# Patient Record
Sex: FEMALE | Race: WHITE | NOT HISPANIC OR LATINO | Employment: OTHER | URBAN - METROPOLITAN AREA
[De-identification: names, ages, dates, MRNs, and addresses within clinical notes are randomized per-mention and may not be internally consistent; named-entity substitution may affect disease eponyms.]

---

## 2017-02-13 ENCOUNTER — HOSPITAL ENCOUNTER (OUTPATIENT)
Dept: ONCOLOGY | Facility: CLINIC | Age: 67
Discharge: HOME | End: 2017-02-13
Attending: INTERNAL MEDICINE | Admitting: INTERNAL MEDICINE

## 2017-09-21 ENCOUNTER — HOSPITAL ENCOUNTER (OUTPATIENT)
Dept: ONCOLOGY | Facility: HOSPITAL | Age: 67
Discharge: HOME | End: 2017-09-21
Attending: NURSE PRACTITIONER | Admitting: NURSE PRACTITIONER

## 2017-09-21 ENCOUNTER — CLINICAL SUPPORT (OUTPATIENT)
Dept: ONCOLOGY | Facility: HOSPITAL | Age: 67
End: 2017-09-21

## 2017-09-21 ENCOUNTER — HOSPITAL ENCOUNTER (OUTPATIENT)
Dept: ONCOLOGY | Facility: CLINIC | Age: 67
Setting detail: INFUSION SERIES
Discharge: HOME | End: 2017-09-21
Attending: NURSE PRACTITIONER | Admitting: NURSE PRACTITIONER

## 2017-09-21 NOTE — PROGRESS NOTES
PATIENTS ONCOLOGY RECORD LOCATED IN [facility]      Subjective     Name:  DAT LAUREN     Date:  2017  Address:  [address] [place] [address]  Home: [phone]  :  1950 AGE:  67 y.o.        RECORDS OBTAINED:  Patients Oncology Record is located in [facility]

## 2018-01-03 ENCOUNTER — HOSPITAL ENCOUNTER (OUTPATIENT)
Dept: FAMILY MEDICINE CLINIC | Facility: CLINIC | Age: 68
Setting detail: SPECIMEN
Discharge: HOME | End: 2018-01-03
Attending: FAMILY MEDICINE | Admitting: FAMILY MEDICINE

## 2018-01-03 LAB
ALBUMIN SERPL-MCNC: 3.7 G/DL (ref 3.5–4.8)
ALBUMIN/GLOB SERPL: 1.4 {RATIO} (ref 1–1.7)
ALP SERPL-CCNC: 78 IU/L (ref 32–91)
ALT SERPL-CCNC: 15 IU/L (ref 14–54)
ANION GAP SERPL CALC-SCNC: 10.7 MMOL/L (ref 10–20)
AST SERPL-CCNC: 18 IU/L (ref 15–41)
BILIRUB SERPL-MCNC: 0.7 MG/DL (ref 0.3–1.2)
BUN SERPL-MCNC: 24 MG/DL (ref 8–20)
BUN/CREAT SERPL: 30 (ref 5.4–26.2)
CALCIUM SERPL-MCNC: 9.5 MG/DL (ref 8.9–10.3)
CHLORIDE SERPL-SCNC: 106 MMOL/L (ref 101–111)
CHOLEST SERPL-MCNC: 208 MG/DL
CHOLEST/HDLC SERPL: 2.5 {RATIO}
CONV CO2: 27 MMOL/L (ref 22–32)
CONV LDL CHOLESTEROL DIRECT: 117 MG/DL (ref 0–100)
CONV TOTAL PROTEIN: 6.4 G/DL (ref 6.1–7.9)
CREAT UR-MCNC: 0.8 MG/DL (ref 0.4–1)
GLOBULIN UR ELPH-MCNC: 2.7 G/DL (ref 2.5–3.8)
GLUCOSE SERPL-MCNC: 97 MG/DL (ref 65–99)
HDLC SERPL-MCNC: 83 MG/DL
LDLC/HDLC SERPL: 1.4 {RATIO}
LIPID INTERPRETATION: ABNORMAL
POTASSIUM SERPL-SCNC: 4.7 MMOL/L (ref 3.6–5.1)
SODIUM SERPL-SCNC: 139 MMOL/L (ref 136–144)
TRIGL SERPL-MCNC: 35 MG/DL
VLDLC SERPL CALC-MCNC: 8 MG/DL

## 2018-05-14 ENCOUNTER — HOSPITAL ENCOUNTER (OUTPATIENT)
Dept: ONCOLOGY | Facility: CLINIC | Age: 68
Setting detail: INFUSION SERIES
Discharge: HOME | End: 2018-05-14
Attending: INTERNAL MEDICINE | Admitting: INTERNAL MEDICINE

## 2018-05-14 ENCOUNTER — CLINICAL SUPPORT (OUTPATIENT)
Dept: ONCOLOGY | Facility: HOSPITAL | Age: 68
End: 2018-05-14

## 2018-05-14 NOTE — PROGRESS NOTES
PATIENTS ONCOLOGY RECORD LOCATED IN [facility]      Subjective     Name:  DAT LAUREN     Date:  2018  Address:  [address] [place] [address]  Home: [phone]  :  1950 AGE:  68 y.o.        RECORDS OBTAINED:  Patients Oncology Record is located in [facility]

## 2018-10-05 ENCOUNTER — HOSPITAL ENCOUNTER (OUTPATIENT)
Dept: FAMILY MEDICINE CLINIC | Facility: CLINIC | Age: 68
Setting detail: SPECIMEN
Discharge: HOME | End: 2018-10-05
Attending: FAMILY MEDICINE | Admitting: FAMILY MEDICINE

## 2018-10-05 LAB
BASOPHILS # BLD AUTO: 0 10*3/UL (ref 0–0.2)
BASOPHILS NFR BLD AUTO: 1 % (ref 0–2)
DIFFERENTIAL METHOD BLD: (no result)
EOSINOPHIL # BLD AUTO: 0.4 10*3/UL (ref 0–0.3)
EOSINOPHIL # BLD AUTO: 8 % (ref 0–3)
ERYTHROCYTE [DISTWIDTH] IN BLOOD BY AUTOMATED COUNT: 12.9 % (ref 11.5–14.5)
HCT VFR BLD AUTO: 38.6 % (ref 35–49)
HGB BLD-MCNC: 13.1 G/DL (ref 12–15)
LYMPHOCYTES # BLD AUTO: 2.2 10*3/UL (ref 0.8–4.8)
LYMPHOCYTES NFR BLD AUTO: 40 % (ref 18–42)
MCH RBC QN AUTO: 30.4 PG (ref 26–32)
MCHC RBC AUTO-ENTMCNC: 33.9 G/DL (ref 32–36)
MCV RBC AUTO: 89.6 FL (ref 80–94)
MONOCYTES # BLD AUTO: 0.5 10*3/UL (ref 0.1–1.3)
MONOCYTES NFR BLD AUTO: 9 % (ref 2–11)
NEUTROPHILS # BLD AUTO: 2.4 10*3/UL (ref 2.3–8.6)
NEUTROPHILS NFR BLD AUTO: 42 % (ref 50–75)
NRBC BLD AUTO-RTO: 0 /100{WBCS}
NRBC/RBC NFR BLD MANUAL: 0 10*3/UL
PLATELET # BLD AUTO: 214 10*3/UL (ref 150–450)
PMV BLD AUTO: 8.6 FL (ref 7.4–10.4)
RBC # BLD AUTO: 4.3 10*6/UL (ref 4–5.4)
WBC # BLD AUTO: 5.5 10*3/UL (ref 4.5–11.5)

## 2019-01-07 ENCOUNTER — HOSPITAL ENCOUNTER (OUTPATIENT)
Dept: MAMMOGRAPHY | Facility: HOSPITAL | Age: 69
Discharge: HOME | End: 2019-01-07
Attending: INTERNAL MEDICINE | Admitting: INTERNAL MEDICINE

## 2019-01-07 ENCOUNTER — HOSPITAL ENCOUNTER (OUTPATIENT)
Dept: FAMILY MEDICINE CLINIC | Facility: CLINIC | Age: 69
Setting detail: SPECIMEN
Discharge: HOME | End: 2019-01-07
Attending: FAMILY MEDICINE | Admitting: FAMILY MEDICINE

## 2019-01-07 LAB
ALBUMIN SERPL-MCNC: 3.8 G/DL (ref 3.5–4.8)
ALBUMIN/GLOB SERPL: 1.5 {RATIO} (ref 1–1.7)
ALP SERPL-CCNC: 80 IU/L (ref 32–91)
ALT SERPL-CCNC: 17 IU/L (ref 14–54)
ANION GAP SERPL CALC-SCNC: 12.9 MMOL/L (ref 10–20)
AST SERPL-CCNC: 17 IU/L (ref 15–41)
BILIRUB SERPL-MCNC: 0.8 MG/DL (ref 0.3–1.2)
BUN SERPL-MCNC: 25 MG/DL (ref 8–20)
BUN/CREAT SERPL: 35.7 (ref 5.4–26.2)
CALCIUM SERPL-MCNC: 8.9 MG/DL (ref 8.9–10.3)
CHLORIDE SERPL-SCNC: 102 MMOL/L (ref 101–111)
CHOLEST SERPL-MCNC: 217 MG/DL
CHOLEST/HDLC SERPL: 2.3 {RATIO}
CONV CO2: 26 MMOL/L (ref 22–32)
CONV LDL CHOLESTEROL DIRECT: 117 MG/DL (ref 0–100)
CONV TOTAL PROTEIN: 6.3 G/DL (ref 6.1–7.9)
CREAT UR-MCNC: 0.7 MG/DL (ref 0.4–1)
GLOBULIN UR ELPH-MCNC: 2.5 G/DL (ref 2.5–3.8)
GLUCOSE SERPL-MCNC: 94 MG/DL (ref 65–99)
HDLC SERPL-MCNC: 94 MG/DL
LDLC/HDLC SERPL: 1.2 {RATIO}
LIPID INTERPRETATION: ABNORMAL
POTASSIUM SERPL-SCNC: 3.9 MMOL/L (ref 3.6–5.1)
SODIUM SERPL-SCNC: 137 MMOL/L (ref 136–144)
TRIGL SERPL-MCNC: 49 MG/DL
VLDLC SERPL CALC-MCNC: 6 MG/DL

## 2019-05-13 ENCOUNTER — CLINICAL SUPPORT (OUTPATIENT)
Dept: ONCOLOGY | Facility: HOSPITAL | Age: 69
End: 2019-05-13

## 2019-05-13 ENCOUNTER — HOSPITAL ENCOUNTER (OUTPATIENT)
Dept: ONCOLOGY | Facility: CLINIC | Age: 69
Setting detail: INFUSION SERIES
Discharge: HOME | End: 2019-05-13
Attending: INTERNAL MEDICINE | Admitting: INTERNAL MEDICINE

## 2019-05-13 NOTE — PROGRESS NOTES
PATIENTS ONCOLOGY RECORD LOCATED IN [facility]      Subjective     Name:  DAT LAUREN     Date:  2019  Address:  [address] [place] [address]  Home: [unfilled]  :  1950 AGE:  69 y.o.        RECORDS OBTAINED:  Patients Oncology Record is located in [facility]

## 2019-07-15 PROBLEM — M19.90 ARTHRITIS: Status: ACTIVE | Noted: 2019-07-15

## 2019-09-26 ENCOUNTER — TELEPHONE (OUTPATIENT)
Dept: FAMILY MEDICINE CLINIC | Facility: CLINIC | Age: 69
End: 2019-09-26

## 2019-09-26 RX ORDER — SCOLOPAMINE TRANSDERMAL SYSTEM 1 MG/1
1 PATCH, EXTENDED RELEASE TRANSDERMAL
Qty: 4 PATCH | Refills: 0 | Status: SHIPPED | OUTPATIENT
Start: 2019-09-26 | End: 2020-02-18

## 2019-12-16 ENCOUNTER — TELEPHONE (OUTPATIENT)
Dept: ONCOLOGY | Facility: CLINIC | Age: 69
End: 2019-12-16

## 2019-12-16 ENCOUNTER — TRANSCRIBE ORDERS (OUTPATIENT)
Dept: ADMINISTRATIVE | Facility: HOSPITAL | Age: 69
End: 2019-12-16

## 2019-12-16 DIAGNOSIS — Z12.39 SCREENING BREAST EXAMINATION: Primary | ICD-10-CM

## 2019-12-16 NOTE — TELEPHONE ENCOUNTER
Ms. Gross left message asking about Dr. Beard leaving.  Returned call, discussed options.  She will stay w/ Dr. Beard.  She called [facility] and scheduled mammogram, appears order from Dr. Beard's current office.  May appt canceled.

## 2019-12-30 ENCOUNTER — TELEPHONE (OUTPATIENT)
Dept: FAMILY MEDICINE CLINIC | Facility: CLINIC | Age: 69
End: 2019-12-30

## 2019-12-30 DIAGNOSIS — Z78.0 POSTMENOPAUSAL STATUS: Primary | ICD-10-CM

## 2020-01-08 ENCOUNTER — TELEPHONE (OUTPATIENT)
Dept: FAMILY MEDICINE CLINIC | Facility: CLINIC | Age: 70
End: 2020-01-08

## 2020-01-08 DIAGNOSIS — E78.2 MIXED HYPERLIPIDEMIA: Primary | ICD-10-CM

## 2020-01-08 DIAGNOSIS — E03.9 ACQUIRED HYPOTHYROIDISM: ICD-10-CM

## 2020-01-15 RX ORDER — MELOXICAM 15 MG/1
15 TABLET ORAL DAILY
Qty: 90 TABLET | Refills: 0 | Status: SHIPPED | OUTPATIENT
Start: 2020-01-15 | End: 2020-02-18 | Stop reason: SDUPTHER

## 2020-01-15 RX ORDER — LEVOTHYROXINE SODIUM 0.05 MG/1
50 TABLET ORAL DAILY
Qty: 90 TABLET | Refills: 0 | Status: SHIPPED | OUTPATIENT
Start: 2020-01-15 | End: 2020-02-18 | Stop reason: DRUGHIGH

## 2020-01-20 ENCOUNTER — HOSPITAL ENCOUNTER (OUTPATIENT)
Dept: ULTRASOUND IMAGING | Facility: HOSPITAL | Age: 70
Discharge: HOME | End: 2020-01-20

## 2020-01-20 ENCOUNTER — HOSPITAL ENCOUNTER (OUTPATIENT)
Dept: MAMMOGRAPHY | Facility: HOSPITAL | Age: 70
Discharge: HOME | End: 2020-01-20
Admitting: FAMILY MEDICINE

## 2020-01-20 ENCOUNTER — HOSPITAL ENCOUNTER (OUTPATIENT)
Dept: BONE DENSITY | Facility: HOSPITAL | Age: 70
Discharge: HOME | End: 2020-01-20

## 2020-01-20 DIAGNOSIS — N64.59 INVERSION, NIPPLE: ICD-10-CM

## 2020-01-20 DIAGNOSIS — N64.52 NIPPLE DISCHARGE: ICD-10-CM

## 2020-01-20 DIAGNOSIS — Z12.39 SCREENING BREAST EXAMINATION: ICD-10-CM

## 2020-01-20 PROCEDURE — 76642 ULTRASOUND BREAST LIMITED: CPT

## 2020-01-20 PROCEDURE — G0279 TOMOSYNTHESIS, MAMMO: HCPCS

## 2020-01-20 PROCEDURE — 77066 DX MAMMO INCL CAD BI: CPT

## 2020-01-20 PROCEDURE — 77080 DXA BONE DENSITY AXIAL: CPT

## 2020-02-10 ENCOUNTER — LAB (OUTPATIENT)
Dept: FAMILY MEDICINE CLINIC | Facility: CLINIC | Age: 70
End: 2020-02-10

## 2020-02-10 DIAGNOSIS — E78.2 MIXED HYPERLIPIDEMIA: ICD-10-CM

## 2020-02-10 LAB
ALBUMIN SERPL-MCNC: 4.2 G/DL (ref 3.5–5.2)
ALBUMIN/GLOB SERPL: 1.9 G/DL
ALP SERPL-CCNC: 93 U/L (ref 39–117)
ALT SERPL W P-5'-P-CCNC: 13 U/L (ref 1–33)
ANION GAP SERPL CALCULATED.3IONS-SCNC: 11.4 MMOL/L (ref 5–15)
AST SERPL-CCNC: 12 U/L (ref 1–32)
BILIRUB SERPL-MCNC: 0.4 MG/DL (ref 0.2–1.2)
BUN BLD-MCNC: 29 MG/DL (ref 8–23)
BUN/CREAT SERPL: 39.7 (ref 7–25)
CALCIUM SPEC-SCNC: 9.2 MG/DL (ref 8.6–10.5)
CHLORIDE SERPL-SCNC: 101 MMOL/L (ref 98–107)
CHOLEST SERPL-MCNC: 215 MG/DL (ref 0–200)
CO2 SERPL-SCNC: 26.6 MMOL/L (ref 22–29)
CREAT BLD-MCNC: 0.73 MG/DL (ref 0.57–1)
GFR SERPL CREATININE-BSD FRML MDRD: 79 ML/MIN/1.73
GLOBULIN UR ELPH-MCNC: 2.2 GM/DL
GLUCOSE BLD-MCNC: 95 MG/DL (ref 65–99)
HDLC SERPL-MCNC: 90 MG/DL (ref 40–60)
LDLC SERPL CALC-MCNC: 114 MG/DL (ref 0–100)
LDLC/HDLC SERPL: 1.27 {RATIO}
POTASSIUM BLD-SCNC: 3.7 MMOL/L (ref 3.5–5.2)
PROT SERPL-MCNC: 6.4 G/DL (ref 6–8.5)
SODIUM BLD-SCNC: 139 MMOL/L (ref 136–145)
TRIGL SERPL-MCNC: 54 MG/DL (ref 0–150)
TSH SERPL DL<=0.05 MIU/L-ACNC: 7.13 UIU/ML (ref 0.27–4.2)
VLDLC SERPL-MCNC: 10.8 MG/DL (ref 5–40)

## 2020-02-10 PROCEDURE — 84443 ASSAY THYROID STIM HORMONE: CPT | Performed by: FAMILY MEDICINE

## 2020-02-10 PROCEDURE — 80053 COMPREHEN METABOLIC PANEL: CPT | Performed by: FAMILY MEDICINE

## 2020-02-10 PROCEDURE — 36415 COLL VENOUS BLD VENIPUNCTURE: CPT | Performed by: FAMILY MEDICINE

## 2020-02-10 PROCEDURE — 80061 LIPID PANEL: CPT | Performed by: FAMILY MEDICINE

## 2020-02-18 ENCOUNTER — OFFICE VISIT (OUTPATIENT)
Dept: FAMILY MEDICINE CLINIC | Facility: CLINIC | Age: 70
End: 2020-02-18

## 2020-02-18 VITALS
HEART RATE: 65 BPM | BODY MASS INDEX: 30.16 KG/M2 | DIASTOLIC BLOOD PRESSURE: 83 MMHG | SYSTOLIC BLOOD PRESSURE: 157 MMHG | OXYGEN SATURATION: 98 % | WEIGHT: 181 LBS | HEIGHT: 65 IN

## 2020-02-18 DIAGNOSIS — Z00.00 ENCOUNTER FOR PREVENTIVE CARE: Primary | ICD-10-CM

## 2020-02-18 DIAGNOSIS — E78.2 MIXED HYPERLIPIDEMIA: ICD-10-CM

## 2020-02-18 DIAGNOSIS — E03.9 ACQUIRED HYPOTHYROIDISM: ICD-10-CM

## 2020-02-18 PROBLEM — N39.46 MIXED STRESS AND URGE URINARY INCONTINENCE: Status: ACTIVE | Noted: 2019-01-07

## 2020-02-18 PROBLEM — R21 SKIN RASH: Status: ACTIVE | Noted: 2018-09-29

## 2020-02-18 LAB
BILIRUB BLD-MCNC: NEGATIVE MG/DL
CLARITY, POC: CLEAR
COLOR UR: YELLOW
GLUCOSE UR STRIP-MCNC: NEGATIVE MG/DL
KETONES UR QL: NEGATIVE
LEUKOCYTE EST, POC: NEGATIVE
NITRITE UR-MCNC: NEGATIVE MG/ML
PH UR: 7 [PH] (ref 5–8)
PROT UR STRIP-MCNC: NEGATIVE MG/DL
RBC # UR STRIP: ABNORMAL /UL
SP GR UR: 1.02 (ref 1–1.03)
UROBILINOGEN UR QL: NORMAL

## 2020-02-18 PROCEDURE — 81003 URINALYSIS AUTO W/O SCOPE: CPT | Performed by: FAMILY MEDICINE

## 2020-02-18 PROCEDURE — 99397 PER PM REEVAL EST PAT 65+ YR: CPT | Performed by: FAMILY MEDICINE

## 2020-02-18 PROCEDURE — G0439 PPPS, SUBSEQ VISIT: HCPCS | Performed by: FAMILY MEDICINE

## 2020-02-18 RX ORDER — ELECTROLYTES/DEXTROSE
SOLUTION, ORAL ORAL
COMMUNITY

## 2020-02-18 RX ORDER — MELOXICAM 15 MG/1
15 TABLET ORAL DAILY
Qty: 90 TABLET | Refills: 3 | Status: SHIPPED | OUTPATIENT
Start: 2020-02-18 | End: 2021-04-07

## 2020-02-18 RX ORDER — LEVOTHYROXINE SODIUM 0.07 MG/1
75 TABLET ORAL DAILY
Qty: 90 TABLET | Refills: 3 | Status: SHIPPED | OUTPATIENT
Start: 2020-02-18 | End: 2021-03-06

## 2020-02-18 NOTE — PATIENT INSTRUCTIONS
Talk to pharmacist about the shingles vaccine (SHINGRIX)  Keep working to lose weight through healthy eating and exercise.  Stop the synthroid 50 and start the new dose  Come back for repeat thyroid test in 4-6 weeks

## 2020-02-18 NOTE — PROGRESS NOTES
The ABCs of the Annual Wellness Visit  Subsequent Medicare Wellness Visit    Chief Complaint   Patient presents with   • Annual Exam     also need meloxicam and synthroid refilled       Subjective   History of Present Illness:  Gosia Gross is a 70 y.o. female who presents for a Subsequent Medicare Wellness Visit.  Says her insurance also pays for her to have a routine cpe yearly  Also needs follow up on bp, chol, and thyroid  Labs were done prior to appt and reviewed with her  Colonoscopy in 2011  tdap 10/3/11    HEALTH RISK ASSESSMENT    Recent Hospitalizations:  No hospitalization(s) within the last year.    Current Medical Providers:  Patient Care Team:  Genny Hammond MD as PCP - General (Family Medicine)  Genny Hammond MD as PCP - Family Medicine    Smoking Status:  Social History     Tobacco Use   Smoking Status Never Smoker   Smokeless Tobacco Never Used       Alcohol Consumption:  Social History     Substance and Sexual Activity   Alcohol Use Yes    Comment: social- rarely, wine       Depression Screen:   PHQ-2/PHQ-9 Depression Screening 2/18/2020   Little interest or pleasure in doing things 0   Feeling down, depressed, or hopeless 0   Total Score 0       Fall Risk Screen:  STEADI Fall Risk Assessment was completed, and patient is at LOW risk for falls.Assessment completed on:2/18/2020    Health Habits and Functional and Cognitive Screening:  No flowsheet data found.      Does the patient have evidence of cognitive impairment? No   mmse done 29/29    Asprin use counseling:Taking ASA appropriately as indicated    Age-appropriate Screening Schedule:  Refer to the list below for future screening recommendations based on patient's age, sex and/or medical conditions. Orders for these recommended tests are listed in the plan section. The patient has been provided with a written plan.    Health Maintenance   Topic Date Due   • TDAP/TD VACCINES (1 - Tdap) 01/13/1961   • ZOSTER VACCINE (1  of 2) 01/13/1999   • LIPID PANEL  02/10/2021   • COLONOSCOPY  10/27/2021   • MAMMOGRAM  01/20/2022   • INFLUENZA VACCINE  Completed          The following portions of the patient's history were reviewed and updated as appropriate: allergies, current medications, past family history, past medical history, past social history, past surgical history and problem list.    Outpatient Medications Prior to Visit   Medication Sig Dispense Refill   • aspirin 81 MG tablet ASPIRIN 81 MG ORAL TABLET     • Bioflavonoid Products (VITAMIN C PLUS) 500 MG tablet VITAMIN C PLUS 500 MG TABS     • Biotin 1000 MCG tablet BIOTIN 1000 MCG TABS     • Calcium Carbonate-Vitamin D (CALCIUM 600+D) 600-200 MG-UNIT tablet CALCIUM 600+D 600-200 MG-UNIT TABS     • Glucosamine-Chondroit-Vit C-Mn (GLUCOSAMINE CHONDR 1500 COMPLX) capsule GLUCOSAMINE CHONDR 1500 COMPLX CAPS     • magnesium oxide 250 MG tablet MAGNESIUM OXIDE 250 MG TABS     • Multiple Vitamins-Minerals (MULTIVITAMIN ADULT) tablet Take  by mouth.     • potassium gluconate (EQL POTASSIUM GLUCONATE) 595 (99 K) MG tablet tablet EQL POTASSIUM GLUCONATE 595 (99 K) MG ORAL TABLET     • levothyroxine (SYNTHROID, LEVOTHROID) 50 MCG tablet Take 1 tablet by mouth Daily. Take on empty stomach. LAST REFILL UNTIL SEEN IN OFFICE 90 tablet 0   • meloxicam (MOBIC) 15 MG tablet Take 1 tablet by mouth Daily. LAST REFILL UNTIL SEEN IN OFFICE 90 tablet 0   • betamethasone, augmented, (DIPROLENE AF) 0.05 % cream Apply  topically to the appropriate area as directed 2 (Two) Times a Day. 50 g 0   • predniSONE (DELTASONE) 10 MG (21) tablet pack Take  by mouth Daily. Use as directed on package 21 each 0   • Scopolamine (TRANSDERM-SCOP, 1.5 MG,) 1.5 MG/3DAYS patch Place 1 patch on the skin as directed by provider Every 72 (Seventy-Two) Hours. 4 patch 0     No facility-administered medications prior to visit.        Patient Active Problem List   Diagnosis   • Abnormal mammogram   • Breast lump   • Hyperlipidemia  "  • Hypothyroidism   • Adenocarcinoma, breast (CMS/HCC)   • Arthritis   • Anemia   • Arthralgia   • Family history of colonic polyps   • Fatigue   • Hematuria   • Mixed stress and urge urinary incontinence   • Neuropathic pain   • Other sleep apnea   • Personal history of malignant neoplasm of breast   • Preoperative examination   • Restless leg syndrome   • Shingles   • Skin rash   • Snoring   • Toe pain   • Osteoarthritis   • Pneumonia       Advanced Care Planning:  ACP discussion was held with the patient during this visit. Patient has an advance directive that is valid in EMR.     Review of Systems   Constitutional: Negative.    HENT: Negative.    Respiratory: Negative.    Cardiovascular: Negative.    Gastrointestinal: Negative.    Endocrine: Negative.    Musculoskeletal: Positive for arthralgias.   Skin: Negative.    Allergic/Immunologic: Negative.    Neurological: Negative.    Hematological: Negative.    Psychiatric/Behavioral: Negative.        Compared to one year ago, the patient feels her physical health is the same.  Compared to one year ago, the patient feels her mental health is the same.    Reviewed chart for potential of high risk medication in the elderly: yes  Reviewed chart for potential of harmful drug interactions in the elderly:yes    Objective         Vitals:    02/18/20 1221   BP: 157/83   BP Location: Left arm   Patient Position: Sitting   Cuff Size: Large Adult   Pulse: 65   SpO2: 98%   Weight: 82.1 kg (181 lb)   Height: 165.1 cm (65\")       Body mass index is 30.12 kg/m².  Discussed the patient's BMI with her. The BMI is above average; BMI management plan is completed.    Physical Exam   Constitutional: She is oriented to person, place, and time. She appears well-developed and well-nourished.   obese   HENT:   Head: Normocephalic and atraumatic.   Right Ear: Hearing, tympanic membrane and ear canal normal.   Left Ear: Hearing, tympanic membrane and ear canal normal.   Nose: Nose normal. "   Mouth/Throat: Uvula is midline, oropharynx is clear and moist and mucous membranes are normal.   Eyes: Pupils are equal, round, and reactive to light. Conjunctivae and EOM are normal.   Neck: Normal range of motion. Neck supple. No JVD present. Carotid bruit is not present. No thyromegaly present.   Cardiovascular: Normal rate, regular rhythm, normal heart sounds, intact distal pulses and normal pulses.   Pulmonary/Chest: Effort normal and breath sounds normal. Right breast exhibits no inverted nipple, no mass, no nipple discharge, no skin change and no tenderness. Left breast exhibits inverted nipple (has always been inverted). Left breast exhibits no mass, no nipple discharge, no skin change and no tenderness.   Abdominal: Soft. Bowel sounds are normal. There is no hepatosplenomegaly. There is no tenderness. No hernia.   Musculoskeletal: She exhibits no edema.   Lymphadenopathy:     She has no cervical adenopathy.   Neurological: She is alert and oriented to person, place, and time. She has normal strength and normal reflexes. She displays normal reflexes. No cranial nerve deficit.   Skin: Skin is warm and dry. No rash noted.   Psychiatric: She has a normal mood and affect. Her speech is normal.   Nursing note and vitals reviewed.      Lab Results   Component Value Date    TRIG 54 02/10/2020    HDL 90 (H) 02/10/2020     (H) 02/10/2020    VLDL 10.8 02/10/2020      Lab Results   Component Value Date    GLUCOSE 95 02/10/2020    BUN 29 (H) 02/10/2020    CREATININE 0.73 02/10/2020    EGFRIFNONA 79 02/10/2020    BCR 39.7 (H) 02/10/2020    K 3.7 02/10/2020    CO2 26.6 02/10/2020    CALCIUM 9.2 02/10/2020    ALBUMIN 4.20 02/10/2020    LABIL2 1.5 01/07/2019    AST 12 02/10/2020    ALT 13 02/10/2020     Lab Results   Component Value Date    TSH 7.130 (H) 02/10/2020     Brief Urine Lab Results  (Last result in the past 365 days)      Color   Clarity   Blood   Leuk Est   Nitrite   Protein   CREAT   Urine HCG         02/18/20 1354 Yellow Clear Trace Negative Negative Negative               Assessment/Plan   Medicare Risks and Personalized Health Plan  CMS Preventative Services Quick Reference  Immunizations Discussed/Encouraged (specific immunizations; Shingrix )    The above risks/problems have been discussed with the patient.  Pertinent information has been shared with the patient in the After Visit Summary.  Follow up plans and orders are seen below in the Assessment/Plan Section.    Diagnoses and all orders for this visit:    1. Encounter for preventive care (Primary)  -     POCT urinalysis dipstick, automated    2. Mixed hyperlipidemia    3. Acquired hypothyroidism    Other orders  -     levothyroxine (SYNTHROID) 75 MCG tablet; Take 1 tablet by mouth Daily.  Dispense: 90 tablet; Refill: 3  -     meloxicam (MOBIC) 15 MG tablet; Take 1 tablet by mouth Daily. LAST REFILL UNTIL SEEN IN OFFICE  Dispense: 90 tablet; Refill: 3      Follow Up:  Return in about 1 year (around 2/18/2021).     An After Visit Summary and PPPS were given to the patient.     She is doing well   Her thyroid dose will be adjusted as her tsh is still too high  She has mixed urinary incontinence but this has improved now that she has been doing kegel exercises  Counseled on weight loss  She is UTD on pneumonia and flu vaccines  Mammogram and dexa scan was done In Dec and reviewed with patient  She will come back in 4-6 weeks for repeat tsh  She will continue her other medications

## 2020-06-22 ENCOUNTER — LAB (OUTPATIENT)
Dept: FAMILY MEDICINE CLINIC | Facility: CLINIC | Age: 70
End: 2020-06-22

## 2020-06-22 DIAGNOSIS — E03.9 ACQUIRED HYPOTHYROIDISM: ICD-10-CM

## 2020-06-22 LAB — TSH SERPL DL<=0.05 MIU/L-ACNC: 2.18 UIU/ML (ref 0.27–4.2)

## 2020-06-22 PROCEDURE — 84443 ASSAY THYROID STIM HORMONE: CPT | Performed by: FAMILY MEDICINE

## 2020-06-22 PROCEDURE — 36415 COLL VENOUS BLD VENIPUNCTURE: CPT

## 2021-03-06 RX ORDER — LEVOTHYROXINE SODIUM 0.07 MG/1
75 TABLET ORAL DAILY
Qty: 90 TABLET | Refills: 0 | Status: SHIPPED | OUTPATIENT
Start: 2021-03-06 | End: 2021-07-29 | Stop reason: SDUPTHER

## 2021-03-10 ENCOUNTER — TRANSCRIBE ORDERS (OUTPATIENT)
Dept: ADMINISTRATIVE | Facility: HOSPITAL | Age: 71
End: 2021-03-10

## 2021-03-10 DIAGNOSIS — Z12.39 SCREENING BREAST EXAMINATION: Primary | ICD-10-CM

## 2021-04-07 RX ORDER — MELOXICAM 15 MG/1
TABLET ORAL
Qty: 90 TABLET | Refills: 0 | Status: SHIPPED | OUTPATIENT
Start: 2021-04-07 | End: 2021-06-08

## 2021-04-08 ENCOUNTER — HOSPITAL ENCOUNTER (OUTPATIENT)
Dept: MAMMOGRAPHY | Facility: HOSPITAL | Age: 71
Discharge: HOME | End: 2021-04-08
Admitting: FAMILY MEDICINE

## 2021-04-08 DIAGNOSIS — Z12.39 SCREENING BREAST EXAMINATION: ICD-10-CM

## 2021-04-08 PROCEDURE — 77067 SCR MAMMO BI INCL CAD: CPT

## 2021-04-08 PROCEDURE — 77063 BREAST TOMOSYNTHESIS BI: CPT

## 2021-06-08 RX ORDER — MELOXICAM 15 MG/1
TABLET ORAL
Qty: 90 TABLET | Refills: 0 | Status: SHIPPED | OUTPATIENT
Start: 2021-06-08 | End: 2021-07-29 | Stop reason: SDUPTHER

## 2021-07-08 ENCOUNTER — TELEPHONE (OUTPATIENT)
Dept: FAMILY MEDICINE CLINIC | Facility: CLINIC | Age: 71
End: 2021-07-08

## 2021-07-08 DIAGNOSIS — Z11.59 NEED FOR HEPATITIS C SCREENING TEST: ICD-10-CM

## 2021-07-08 DIAGNOSIS — E03.9 ACQUIRED HYPOTHYROIDISM: ICD-10-CM

## 2021-07-08 DIAGNOSIS — E78.2 MIXED HYPERLIPIDEMIA: Primary | ICD-10-CM

## 2021-07-08 NOTE — TELEPHONE ENCOUNTER
Pt has apt for medicare wellness on 7/29/21  She wants to come and get her labs drawn before apt.   Requests orders.

## 2021-07-19 ENCOUNTER — LAB (OUTPATIENT)
Dept: FAMILY MEDICINE CLINIC | Facility: CLINIC | Age: 71
End: 2021-07-19

## 2021-07-19 DIAGNOSIS — E78.2 MIXED HYPERLIPIDEMIA: ICD-10-CM

## 2021-07-19 DIAGNOSIS — Z11.59 NEED FOR HEPATITIS C SCREENING TEST: ICD-10-CM

## 2021-07-19 LAB
ALBUMIN SERPL-MCNC: 4.2 G/DL (ref 3.5–5.2)
ALBUMIN/GLOB SERPL: 1.9 G/DL
ALP SERPL-CCNC: 103 U/L (ref 39–117)
ALT SERPL W P-5'-P-CCNC: 10 U/L (ref 1–33)
ANION GAP SERPL CALCULATED.3IONS-SCNC: 7.6 MMOL/L (ref 5–15)
AST SERPL-CCNC: 13 U/L (ref 1–32)
BILIRUB SERPL-MCNC: 0.3 MG/DL (ref 0–1.2)
BUN SERPL-MCNC: 21 MG/DL (ref 8–23)
BUN/CREAT SERPL: 30 (ref 7–25)
CALCIUM SPEC-SCNC: 8.8 MG/DL (ref 8.6–10.5)
CHLORIDE SERPL-SCNC: 108 MMOL/L (ref 98–107)
CHOLEST SERPL-MCNC: 205 MG/DL (ref 0–200)
CO2 SERPL-SCNC: 25.4 MMOL/L (ref 22–29)
CREAT SERPL-MCNC: 0.7 MG/DL (ref 0.57–1)
GFR SERPL CREATININE-BSD FRML MDRD: 82 ML/MIN/1.73
GLOBULIN UR ELPH-MCNC: 2.2 GM/DL
GLUCOSE SERPL-MCNC: 89 MG/DL (ref 65–99)
HCV AB SER DONR QL: NORMAL
HDLC SERPL-MCNC: 88 MG/DL (ref 40–60)
LDLC SERPL CALC-MCNC: 109 MG/DL (ref 0–100)
LDLC/HDLC SERPL: 1.24 {RATIO}
POTASSIUM SERPL-SCNC: 4.4 MMOL/L (ref 3.5–5.2)
PROT SERPL-MCNC: 6.4 G/DL (ref 6–8.5)
SODIUM SERPL-SCNC: 141 MMOL/L (ref 136–145)
TRIGL SERPL-MCNC: 40 MG/DL (ref 0–150)
TSH SERPL DL<=0.05 MIU/L-ACNC: 3.78 UIU/ML (ref 0.27–4.2)
VLDLC SERPL-MCNC: 8 MG/DL (ref 5–40)

## 2021-07-19 PROCEDURE — 84443 ASSAY THYROID STIM HORMONE: CPT | Performed by: FAMILY MEDICINE

## 2021-07-19 PROCEDURE — 36415 COLL VENOUS BLD VENIPUNCTURE: CPT | Performed by: FAMILY MEDICINE

## 2021-07-19 PROCEDURE — 80053 COMPREHEN METABOLIC PANEL: CPT | Performed by: FAMILY MEDICINE

## 2021-07-19 PROCEDURE — 86803 HEPATITIS C AB TEST: CPT | Performed by: FAMILY MEDICINE

## 2021-07-19 PROCEDURE — 80061 LIPID PANEL: CPT | Performed by: FAMILY MEDICINE

## 2021-07-29 ENCOUNTER — OFFICE VISIT (OUTPATIENT)
Dept: FAMILY MEDICINE CLINIC | Facility: CLINIC | Age: 71
End: 2021-07-29

## 2021-07-29 VITALS
WEIGHT: 181 LBS | HEIGHT: 65 IN | SYSTOLIC BLOOD PRESSURE: 159 MMHG | TEMPERATURE: 97.9 F | HEART RATE: 63 BPM | DIASTOLIC BLOOD PRESSURE: 94 MMHG | OXYGEN SATURATION: 94 % | BODY MASS INDEX: 30.16 KG/M2

## 2021-07-29 DIAGNOSIS — Z12.11 SCREENING FOR COLON CANCER: ICD-10-CM

## 2021-07-29 DIAGNOSIS — N39.46 MIXED STRESS AND URGE URINARY INCONTINENCE: ICD-10-CM

## 2021-07-29 DIAGNOSIS — E03.9 ACQUIRED HYPOTHYROIDISM: ICD-10-CM

## 2021-07-29 DIAGNOSIS — Z00.01 ENCOUNTER FOR GENERAL ADULT MEDICAL EXAMINATION WITH ABNORMAL FINDINGS: Primary | ICD-10-CM

## 2021-07-29 DIAGNOSIS — E78.2 MIXED HYPERLIPIDEMIA: ICD-10-CM

## 2021-07-29 DIAGNOSIS — E66.9 OBESITY (BMI 30.0-34.9): ICD-10-CM

## 2021-07-29 PROBLEM — E66.811 OBESITY (BMI 30.0-34.9): Status: ACTIVE | Noted: 2021-07-29

## 2021-07-29 PROBLEM — H25.813 COMBINED FORMS OF AGE-RELATED CATARACT OF BOTH EYES: Status: ACTIVE | Noted: 2021-06-21

## 2021-07-29 PROCEDURE — 99213 OFFICE O/P EST LOW 20 MIN: CPT | Performed by: FAMILY MEDICINE

## 2021-07-29 PROCEDURE — 99397 PER PM REEVAL EST PAT 65+ YR: CPT | Performed by: FAMILY MEDICINE

## 2021-07-29 PROCEDURE — G0438 PPPS, INITIAL VISIT: HCPCS | Performed by: FAMILY MEDICINE

## 2021-07-29 PROCEDURE — 1159F MED LIST DOCD IN RCRD: CPT | Performed by: FAMILY MEDICINE

## 2021-07-29 PROCEDURE — 1170F FXNL STATUS ASSESSED: CPT | Performed by: FAMILY MEDICINE

## 2021-07-29 PROCEDURE — 1125F AMNT PAIN NOTED PAIN PRSNT: CPT | Performed by: FAMILY MEDICINE

## 2021-07-29 RX ORDER — DIMENHYDRINATE 50 MG
TABLET ORAL
COMMUNITY

## 2021-07-29 RX ORDER — MELOXICAM 15 MG/1
15 TABLET ORAL DAILY
Qty: 90 TABLET | Refills: 3 | Status: SHIPPED | OUTPATIENT
Start: 2021-07-29 | End: 2022-06-03

## 2021-07-29 RX ORDER — CYANOCOBALAMIN (VITAMIN B-12) 500 MCG
625 TABLET ORAL DAILY
COMMUNITY

## 2021-07-29 RX ORDER — MOXIFLOXACIN 5 MG/ML
SOLUTION/ DROPS OPHTHALMIC
COMMUNITY
Start: 2021-06-21 | End: 2022-08-03

## 2021-07-29 RX ORDER — CHLORAL HYDRATE 500 MG
1000 CAPSULE ORAL
COMMUNITY

## 2021-07-29 RX ORDER — BROMFENAC 0.76 MG/ML
SOLUTION/ DROPS OPHTHALMIC
COMMUNITY
Start: 2021-06-21 | End: 2022-08-03

## 2021-07-29 RX ORDER — LEVOTHYROXINE SODIUM 0.07 MG/1
75 TABLET ORAL DAILY
Qty: 90 TABLET | Refills: 3 | Status: SHIPPED | OUTPATIENT
Start: 2021-07-29 | End: 2022-06-03

## 2021-07-29 NOTE — PROGRESS NOTES
The ABCs of the Annual Wellness Visit  Initial Medicare Wellness Visit    Chief Complaint   Patient presents with   • Medicare Wellness-subsequent   • Urinary Incontinence   • Arthritis     any med arthritis med she can try?       Subjective   History of Present Illness:  Gosia Gross is a 71 y.o. female who presents for an Initial Medicare Wellness Visit.  Also needs follow up on chol and thyroid  C/O urinary incontinence  Labs were done prior to appt  Last colonoscopy was 2011  She has had her covid vaccine  bp at home is normal    HEALTH RISK ASSESSMENT    Recent Hospitalizations:  No hospitalization(s) within the last year.    Current Medical Providers:  Patient Care Team:  Genny Hammond MD as PCP - General (Family Medicine)  Genny Hammond MD as PCP - Family Medicine    Smoking Status:  Social History     Tobacco Use   Smoking Status Never Smoker   Smokeless Tobacco Never Used       Alcohol Consumption:  Social History     Substance and Sexual Activity   Alcohol Use Yes   • Alcohol/week: 1.0 standard drinks   • Types: 1 Glasses of wine per week    Comment: social- rarely, wine       Depression Screen:   PHQ-2/PHQ-9 Depression Screening 7/29/2021   Little interest or pleasure in doing things 0   Feeling down, depressed, or hopeless 0   Total Score 0       Fall Risk Screen:  VÍCTORADI Fall Risk Assessment was completed, and patient is at LOW risk for falls.Assessment completed on:7/29/2021    Health Habits and Functional and Cognitive Screening:  Functional & Cognitive Status 7/29/2021   Do you have difficulty preparing food and eating? No   Do you have difficulty bathing yourself, getting dressed or grooming yourself? No   Do you have difficulty using the toilet? No   Do you have difficulty moving around from place to place? No   Do you have trouble with steps or getting out of a bed or a chair? No   Current Diet Well Balanced Diet   Dental Exam Not up to date   Eye Exam Up to date    Exercise (times per week) 4 times per week   Current Exercises Include Walking   Do you need help using the phone?  No   Are you deaf or do you have serious difficulty hearing?  No   Do you need help with transportation? No   Do you need help shopping? No   Do you need help preparing meals?  No   Do you need help with housework?  No   Do you need help with laundry? No   Do you need help taking your medications? No   Do you need help managing money? No   Do you ever drive or ride in a car without wearing a seat belt? No   Have you felt unusual stress, anger or loneliness in the last month? No   Who do you live with? Spouse   If you need help, do you have trouble finding someone available to you? No   Have you been bothered in the last four weeks by sexual problems? No   Do you have difficulty concentrating, remembering or making decisions? No         Does the patient have evidence of cognitive impairment? No   MMSE done 30/30    Asprin use counseling:Taking ASA appropriately as indicated    Age-appropriate Screening Schedule:  Refer to the list below for future screening recommendations based on patient's age, sex and/or medical conditions. Orders for these recommended tests are listed in the plan section. The patient has been provided with a written plan.    Health Maintenance   Topic Date Due   • ZOSTER VACCINE (3 of 3) 04/14/2020   • INFLUENZA VACCINE  10/01/2021   • TDAP/TD VACCINES (2 - Td or Tdap) 10/03/2021   • DXA SCAN  01/20/2022   • LIPID PANEL  07/19/2022   • MAMMOGRAM  04/08/2023          The following portions of the patient's history were reviewed and updated as appropriate: allergies, current medications, past family history, past medical history, past social history, past surgical history and problem list.    Outpatient Medications Prior to Visit   Medication Sig Dispense Refill   • aspirin 81 MG tablet ASPIRIN 81 MG ORAL TABLET     • Bioflavonoid Products (VITAMIN C PLUS) 500 MG tablet VITAMIN C  PLUS 500 MG TABS     • Biotin 1000 MCG tablet BIOTIN 1000 MCG TABS     • Bromfenac Sodium (BromSite) 0.075 % solution Compounded drop, also includes Prednisilone Sodium Phosphate 1% and Moxifloxacin 0.5%     • Calcium Carbonate-Vitamin D (CALCIUM 600+D) 600-200 MG-UNIT tablet CALCIUM 600+D 600-200 MG-UNIT TABS     • cyanocobalamin (VITAMIN B-12) 500 MCG tablet Take 625 mcg by mouth Daily.     • Flaxseed, Linseed, (Flax Seed Oil) 1000 MG capsule Take  by mouth.     • Glucosamine-Chondroit-Vit C-Mn (GLUCOSAMINE CHONDR 1500 COMPLX) capsule GLUCOSAMINE CHONDR 1500 COMPLX CAPS     • magnesium oxide 250 MG tablet MAGNESIUM OXIDE 250 MG TABS     • moxifloxacin (VIGAMOX) 0.5 % ophthalmic solution Compounded drop, also includes Prednisilone Sodium Phosphate 1% and Bromfenac 0.075%     • Multiple Vitamins-Minerals (MULTIVITAMIN ADULT) tablet Take  by mouth.     • Omega-3 Fatty Acids (fish oil) 1000 MG capsule capsule Take 1,000 mg by mouth Daily With Breakfast.     • potassium gluconate (EQL POTASSIUM GLUCONATE) 595 (99 K) MG tablet tablet EQL POTASSIUM GLUCONATE 595 (99 K) MG ORAL TABLET     • levothyroxine (SYNTHROID, LEVOTHROID) 75 MCG tablet TAKE 1 TABLET BY MOUTH  DAILY 90 tablet 0   • meloxicam (MOBIC) 15 MG tablet TAKE 1 TABLET BY MOUTH  DAILY. 90 tablet 0     No facility-administered medications prior to visit.       Patient Active Problem List   Diagnosis   • Abnormal mammogram   • Breast lump   • Hyperlipidemia   • Hypothyroidism   • Adenocarcinoma, breast (CMS/HCC)   • Arthritis   • Anemia   • Arthralgia   • Family history of colonic polyps   • Fatigue   • Hematuria   • Mixed stress and urge urinary incontinence   • Neuropathic pain   • Other sleep apnea   • Personal history of malignant neoplasm of breast   • Preoperative examination   • Restless leg syndrome   • Shingles   • Skin rash   • Snoring   • Toe pain   • Osteoarthritis   • Pneumonia   • Combined forms of age-related cataract of both eyes   • Encounter  "for general adult medical examination with abnormal findings   • Obesity (BMI 30.0-34.9)       Advanced Care Planning:  ACP discussion was held with the patient during this visit. Patient has an advance directive in EMR which is still valid.     Review of Systems   Constitutional: Negative.    Respiratory: Negative.    Cardiovascular: Negative.    Gastrointestinal: Negative for nausea and vomiting.   Neurological: Negative for syncope, light-headedness and headaches.   Psychiatric/Behavioral: Negative.        Compared to one year ago, the patient feels her physical health is the same.  Compared to one year ago, the patient feels her mental health is the same.    Reviewed chart for potential of high risk medication in the elderly: yes  Reviewed chart for potential of harmful drug interactions in the elderly:yes    Objective         Vitals:    07/29/21 1233   BP: 159/94   BP Location: Left arm   Patient Position: Sitting   Cuff Size: Adult   Pulse: 63   Temp: 97.9 °F (36.6 °C)   TempSrc: Temporal   SpO2: 94%   Weight: 82.1 kg (181 lb)   Height: 165.1 cm (65\")   PainSc:   7   PainLoc: Generalized       Body mass index is 30.12 kg/m².  Discussed the patient's BMI with her. The BMI is above average; BMI management plan is completed.    Physical Exam  Vitals and nursing note reviewed.   Constitutional:       General: She is not in acute distress.     Appearance: She is well-developed and well-groomed. She is obese.   HENT:      Head: Normocephalic and atraumatic.   Neck:      Thyroid: No thyromegaly.   Cardiovascular:      Rate and Rhythm: Normal rate and regular rhythm.      Heart sounds: Normal heart sounds. No murmur heard.   No friction rub. No gallop.    Pulmonary:      Effort: Pulmonary effort is normal. No respiratory distress.      Breath sounds: Normal breath sounds. No wheezing or rales.   Musculoskeletal:      Cervical back: Neck supple.      Right lower leg: No edema.      Left lower leg: No edema. "   Lymphadenopathy:      Cervical: No cervical adenopathy.   Skin:     General: Skin is warm and dry.   Neurological:      Mental Status: She is alert and oriented to person, place, and time.   Psychiatric:         Mood and Affect: Mood normal.         Behavior: Behavior is cooperative.         Lab Results   Component Value Date    TRIG 40 07/19/2021    HDL 88 (H) 07/19/2021     (H) 07/19/2021    VLDL 8 07/19/2021      Lab Results   Component Value Date    GLUCOSE 89 07/19/2021    BUN 21 07/19/2021    CREATININE 0.70 07/19/2021    EGFRIFNONA 82 07/19/2021    BCR 30.0 (H) 07/19/2021    K 4.4 07/19/2021    CO2 25.4 07/19/2021    CALCIUM 8.8 07/19/2021    ALBUMIN 4.20 07/19/2021    LABIL2 1.5 01/07/2019    AST 13 07/19/2021    ALT 10 07/19/2021     Lab Results   Component Value Date    TSH 3.780 07/19/2021         Assessment/Plan   Medicare Risks and Personalized Health Plan  CMS Preventative Services Quick Reference  Obesity/Overweight     The above risks/problems have been discussed with the patient.  Pertinent information has been shared with the patient in the After Visit Summary.  Follow up plans and orders are seen below in the Assessment/Plan Section.    Diagnoses and all orders for this visit:    1. Encounter for general adult medical examination with abnormal findings (Primary)    2. Obesity (BMI 30.0-34.9)    3. Mixed hyperlipidemia    4. Acquired hypothyroidism    5. Mixed stress and urge urinary incontinence  -     Ambulatory Referral to Urology    6. Screening for colon cancer  -     Ambulatory Referral For Screening Colonoscopy    Other orders  -     levothyroxine (SYNTHROID, LEVOTHROID) 75 MCG tablet; Take 1 tablet by mouth Daily.  Dispense: 90 tablet; Refill: 3  -     meloxicam (MOBIC) 15 MG tablet; Take 1 tablet by mouth Daily.  Dispense: 90 tablet; Refill: 3      Follow Up:  Return in about 1 year (around 7/29/2022) for Recheck, Medicare Wellness.     An After Visit Summary and PPPS were given  to the patient.     Counseled on the need for weight loss through exercise and healthy eating   Is up-to-date on vaccines  She is up-to-date on mammogram  She is due colonoscopy and this will be scheduled  She is still struggling with incontinence so I am going to refer her to urology for further evaluation  Refills were sent on her medication  Labs for thyroid and cholesterol reviewed with her and they were good  I will see her back in a year

## 2021-11-26 ENCOUNTER — TRANSCRIBE ORDERS (OUTPATIENT)
Dept: ADMINISTRATIVE | Facility: HOSPITAL | Age: 71
End: 2021-11-26

## 2021-11-26 DIAGNOSIS — M85.80 OSTEOPENIA, UNSPECIFIED LOCATION: Primary | ICD-10-CM

## 2021-11-26 DIAGNOSIS — Z12.31 VISIT FOR SCREENING MAMMOGRAM: ICD-10-CM

## 2022-01-25 ENCOUNTER — APPOINTMENT (OUTPATIENT)
Dept: BONE DENSITY | Facility: HOSPITAL | Age: 72
End: 2022-01-25

## 2022-04-11 ENCOUNTER — APPOINTMENT (OUTPATIENT)
Dept: MAMMOGRAPHY | Facility: HOSPITAL | Age: 72
End: 2022-04-11

## 2022-04-11 ENCOUNTER — APPOINTMENT (OUTPATIENT)
Dept: BONE DENSITY | Facility: HOSPITAL | Age: 72
End: 2022-04-11

## 2022-05-02 ENCOUNTER — HOSPITAL ENCOUNTER (OUTPATIENT)
Dept: BONE DENSITY | Facility: HOSPITAL | Age: 72
Discharge: HOME | End: 2022-05-02

## 2022-05-02 ENCOUNTER — HOSPITAL ENCOUNTER (OUTPATIENT)
Dept: MAMMOGRAPHY | Facility: HOSPITAL | Age: 72
Discharge: HOME | End: 2022-05-02

## 2022-05-02 DIAGNOSIS — Z12.31 VISIT FOR SCREENING MAMMOGRAM: ICD-10-CM

## 2022-05-02 DIAGNOSIS — M85.80 OSTEOPENIA, UNSPECIFIED LOCATION: ICD-10-CM

## 2022-05-02 PROCEDURE — 77080 DXA BONE DENSITY AXIAL: CPT

## 2022-05-02 PROCEDURE — 77067 SCR MAMMO BI INCL CAD: CPT

## 2022-05-02 PROCEDURE — 77063 BREAST TOMOSYNTHESIS BI: CPT

## 2022-06-03 RX ORDER — MELOXICAM 15 MG/1
15 TABLET ORAL DAILY
Qty: 90 TABLET | Refills: 0 | Status: SHIPPED | OUTPATIENT
Start: 2022-06-03 | End: 2022-08-03 | Stop reason: SDUPTHER

## 2022-06-03 RX ORDER — LEVOTHYROXINE SODIUM 0.07 MG/1
75 TABLET ORAL DAILY
Qty: 90 TABLET | Refills: 0 | Status: SHIPPED | OUTPATIENT
Start: 2022-06-03 | End: 2022-07-27 | Stop reason: SDUPTHER

## 2022-07-07 ENCOUNTER — TELEPHONE (OUTPATIENT)
Dept: FAMILY MEDICINE CLINIC | Facility: CLINIC | Age: 72
End: 2022-07-07

## 2022-07-07 DIAGNOSIS — E03.9 ACQUIRED HYPOTHYROIDISM: ICD-10-CM

## 2022-07-07 DIAGNOSIS — E78.2 MIXED HYPERLIPIDEMIA: Primary | ICD-10-CM

## 2022-07-07 DIAGNOSIS — D64.9 ANEMIA, UNSPECIFIED TYPE: ICD-10-CM

## 2022-07-07 NOTE — TELEPHONE ENCOUNTER
Caller: Gosia Gross    Relationship: Self    Best call back number: [phone]    What orders are you requesting (i.e. lab or imaging): LABS    In what timeframe would the patient need to come in: BEFORE APPOINTMENT ON AUGUST 3RD    Where will you receive your lab/imaging services: [facility]

## 2022-07-27 RX ORDER — LEVOTHYROXINE SODIUM 0.07 MG/1
75 TABLET ORAL DAILY
Qty: 90 TABLET | Refills: 0 | Status: SHIPPED | OUTPATIENT
Start: 2022-07-27 | End: 2022-08-03 | Stop reason: SDUPTHER

## 2022-07-27 NOTE — TELEPHONE ENCOUNTER
Caller: Gosia Gross    Relationship: Self    Best call back number: [phone]    Requested Prescriptions:   Requested Prescriptions     Pending Prescriptions Disp Refills   • levothyroxine (SYNTHROID, LEVOTHROID) 75 MCG tablet 90 tablet 0     Sig: Take 1 tablet by mouth Daily.        Pharmacy where request should be sent: [facility]) - [place], [place] - [id number] [phone] [facility] [phone] [facility]     Additional details provided by patient: PATIENT STATES SHE HAS 5 TABLETS REMAINING. SHE HAS AN ANNUAL WELLNESS VISIT SCHEDULED FOR 8/3/22, BUT WILL NEED A REFILL BEFORE THAT APPOINTMENT.    Does the patient have less than a 3 day supply:  [] Yes  [x] No    Trixie Yap   07/27/22 08:36 EDT

## 2022-08-01 ENCOUNTER — LAB (OUTPATIENT)
Dept: FAMILY MEDICINE CLINIC | Facility: CLINIC | Age: 72
End: 2022-08-01

## 2022-08-01 DIAGNOSIS — E78.2 MIXED HYPERLIPIDEMIA: ICD-10-CM

## 2022-08-01 DIAGNOSIS — D64.9 ANEMIA, UNSPECIFIED TYPE: ICD-10-CM

## 2022-08-01 DIAGNOSIS — E03.9 ACQUIRED HYPOTHYROIDISM: ICD-10-CM

## 2022-08-01 LAB
ALBUMIN SERPL-MCNC: 4.2 G/DL (ref 3.5–5.2)
ALBUMIN/GLOB SERPL: 2.1 G/DL
ALP SERPL-CCNC: 107 U/L (ref 39–117)
ALT SERPL W P-5'-P-CCNC: 11 U/L (ref 1–33)
ANION GAP SERPL CALCULATED.3IONS-SCNC: 9 MMOL/L (ref 5–15)
AST SERPL-CCNC: 11 U/L (ref 1–32)
BASOPHILS # BLD AUTO: 0.04 10*3/MM3 (ref 0–0.2)
BASOPHILS NFR BLD AUTO: 0.8 % (ref 0–1.5)
BILIRUB SERPL-MCNC: 0.3 MG/DL (ref 0–1.2)
BUN SERPL-MCNC: 19 MG/DL (ref 8–23)
BUN/CREAT SERPL: 26.4 (ref 7–25)
CALCIUM SPEC-SCNC: 9.1 MG/DL (ref 8.6–10.5)
CHLORIDE SERPL-SCNC: 105 MMOL/L (ref 98–107)
CHOLEST SERPL-MCNC: 198 MG/DL (ref 0–200)
CO2 SERPL-SCNC: 26 MMOL/L (ref 22–29)
CREAT SERPL-MCNC: 0.72 MG/DL (ref 0.57–1)
DEPRECATED RDW RBC AUTO: 38.4 FL (ref 37–54)
EGFRCR SERPLBLD CKD-EPI 2021: 88.4 ML/MIN/1.73
EOSINOPHIL # BLD AUTO: 0.17 10*3/MM3 (ref 0–0.4)
EOSINOPHIL NFR BLD AUTO: 3.5 % (ref 0.3–6.2)
ERYTHROCYTE [DISTWIDTH] IN BLOOD BY AUTOMATED COUNT: 12 % (ref 12.3–15.4)
GLOBULIN UR ELPH-MCNC: 2 GM/DL
GLUCOSE SERPL-MCNC: 90 MG/DL (ref 65–99)
HCT VFR BLD AUTO: 38.4 % (ref 34–46.6)
HDLC SERPL-MCNC: 82 MG/DL (ref 40–60)
HGB BLD-MCNC: 13 G/DL (ref 12–15.9)
IMM GRANULOCYTES # BLD AUTO: 0 10*3/MM3 (ref 0–0.05)
IMM GRANULOCYTES NFR BLD AUTO: 0 % (ref 0–0.5)
LDLC SERPL CALC-MCNC: 108 MG/DL (ref 0–100)
LDLC/HDLC SERPL: 1.32 {RATIO}
LYMPHOCYTES # BLD AUTO: 2.17 10*3/MM3 (ref 0.7–3.1)
LYMPHOCYTES NFR BLD AUTO: 44.7 % (ref 19.6–45.3)
MCH RBC QN AUTO: 29.9 PG (ref 26.6–33)
MCHC RBC AUTO-ENTMCNC: 33.9 G/DL (ref 31.5–35.7)
MCV RBC AUTO: 88.3 FL (ref 79–97)
MONOCYTES # BLD AUTO: 0.46 10*3/MM3 (ref 0.1–0.9)
MONOCYTES NFR BLD AUTO: 9.5 % (ref 5–12)
NEUTROPHILS NFR BLD AUTO: 2.02 10*3/MM3 (ref 1.7–7)
NEUTROPHILS NFR BLD AUTO: 41.5 % (ref 42.7–76)
NRBC BLD AUTO-RTO: 0 /100 WBC (ref 0–0.2)
PLATELET # BLD AUTO: 217 10*3/MM3 (ref 140–450)
PMV BLD AUTO: 10.2 FL (ref 6–12)
POTASSIUM SERPL-SCNC: 4.6 MMOL/L (ref 3.5–5.2)
PROT SERPL-MCNC: 6.2 G/DL (ref 6–8.5)
RBC # BLD AUTO: 4.35 10*6/MM3 (ref 3.77–5.28)
SODIUM SERPL-SCNC: 140 MMOL/L (ref 136–145)
TRIGL SERPL-MCNC: 40 MG/DL (ref 0–150)
TSH SERPL DL<=0.05 MIU/L-ACNC: 4.94 UIU/ML (ref 0.27–4.2)
VLDLC SERPL-MCNC: 8 MG/DL (ref 5–40)
WBC NRBC COR # BLD: 4.86 10*3/MM3 (ref 3.4–10.8)

## 2022-08-01 PROCEDURE — 80053 COMPREHEN METABOLIC PANEL: CPT | Performed by: FAMILY MEDICINE

## 2022-08-01 PROCEDURE — 36415 COLL VENOUS BLD VENIPUNCTURE: CPT

## 2022-08-01 PROCEDURE — 80061 LIPID PANEL: CPT | Performed by: FAMILY MEDICINE

## 2022-08-01 PROCEDURE — 85025 COMPLETE CBC W/AUTO DIFF WBC: CPT | Performed by: FAMILY MEDICINE

## 2022-08-01 PROCEDURE — 84443 ASSAY THYROID STIM HORMONE: CPT | Performed by: FAMILY MEDICINE

## 2022-08-03 ENCOUNTER — OFFICE VISIT (OUTPATIENT)
Dept: FAMILY MEDICINE CLINIC | Facility: CLINIC | Age: 72
End: 2022-08-03

## 2022-08-03 ENCOUNTER — TRANSCRIBE ORDERS (OUTPATIENT)
Dept: ADMINISTRATIVE | Facility: HOSPITAL | Age: 72
End: 2022-08-03

## 2022-08-03 VITALS
TEMPERATURE: 98.2 F | OXYGEN SATURATION: 94 % | BODY MASS INDEX: 29.66 KG/M2 | HEIGHT: 65 IN | WEIGHT: 178 LBS | SYSTOLIC BLOOD PRESSURE: 152 MMHG | DIASTOLIC BLOOD PRESSURE: 82 MMHG | HEART RATE: 79 BPM

## 2022-08-03 DIAGNOSIS — Z83.71 FAMILY HISTORY OF COLONIC POLYPS: ICD-10-CM

## 2022-08-03 DIAGNOSIS — D64.9 ANEMIA, UNSPECIFIED TYPE: ICD-10-CM

## 2022-08-03 DIAGNOSIS — E03.9 ACQUIRED HYPOTHYROIDISM: ICD-10-CM

## 2022-08-03 DIAGNOSIS — Z00.00 ENCOUNTER FOR ANNUAL WELLNESS EXAM IN MEDICARE PATIENT: Primary | ICD-10-CM

## 2022-08-03 DIAGNOSIS — Z12.31 VISIT FOR SCREENING MAMMOGRAM: Primary | ICD-10-CM

## 2022-08-03 DIAGNOSIS — E78.2 MIXED HYPERLIPIDEMIA: ICD-10-CM

## 2022-08-03 DIAGNOSIS — Z12.11 SCREENING FOR COLON CANCER: ICD-10-CM

## 2022-08-03 DIAGNOSIS — M15.9 OSTEOARTHRITIS OF MULTIPLE JOINTS, UNSPECIFIED OSTEOARTHRITIS TYPE: ICD-10-CM

## 2022-08-03 PROCEDURE — 1126F AMNT PAIN NOTED NONE PRSNT: CPT | Performed by: FAMILY MEDICINE

## 2022-08-03 PROCEDURE — G0439 PPPS, SUBSEQ VISIT: HCPCS | Performed by: FAMILY MEDICINE

## 2022-08-03 PROCEDURE — 1159F MED LIST DOCD IN RCRD: CPT | Performed by: FAMILY MEDICINE

## 2022-08-03 PROCEDURE — 1170F FXNL STATUS ASSESSED: CPT | Performed by: FAMILY MEDICINE

## 2022-08-03 RX ORDER — MELOXICAM 15 MG/1
15 TABLET ORAL DAILY
Qty: 90 TABLET | Refills: 3 | Status: SHIPPED | OUTPATIENT
Start: 2022-08-03 | End: 2023-03-22 | Stop reason: SDUPTHER

## 2022-08-03 RX ORDER — LEVOTHYROXINE SODIUM 0.07 MG/1
75 TABLET ORAL
Qty: 90 TABLET | Refills: 3 | Status: SHIPPED | OUTPATIENT
Start: 2022-08-03 | End: 2023-03-22 | Stop reason: SDUPTHER

## 2022-08-03 RX ORDER — LANOLIN ALCOHOL/MO/W.PET/CERES
400 CREAM (GRAM) TOPICAL DAILY
COMMUNITY

## 2022-08-03 NOTE — PROGRESS NOTES
The ABCs of the Annual Wellness Visit  Subsequent Medicare Wellness Visit    No chief complaint on file.     Subjective    History of Present Illness:  Gosia Gross is a 72 y.o. female who presents for a Subsequent Medicare Wellness Visit.  Also needs follow-up on cholesterol, thyroid, and anemia  Labs were done prior to her appointment  She always checks her blood pressure at home on a consistent basis and it runs 110-120 systolic over 70-80 diastolic  She feels well except for her arthritis.  She is currently on meloxicam.  She was thinking about Celebrex.  She tried it in the past and thought the meloxicam helped more.  She has been considering trying the Celebrex again.    The following portions of the patient's history were reviewed and   updated as appropriate: allergies, current medications, past family history, past medical history, past social history, past surgical history and problem list.    Compared to one year ago, the patient feels her physical   health is worse.    Compared to one year ago, the patient feels her mental   health is the same.    Recent Hospitalizations:  She was not admitted to the hospital during the last year.       Current Medical Providers:  Patient Care Team:  Genny Hammond MD as PCP - General (Family Medicine)  Genny Hammond MD as PCP - Family Medicine    Outpatient Medications Prior to Visit   Medication Sig Dispense Refill   • aspirin 81 MG tablet ASPIRIN 81 MG ORAL TABLET     • Bioflavonoid Products (VITAMIN C PLUS) 500 MG tablet VITAMIN C PLUS 500 MG TABS     • Biotin 1000 MCG tablet BIOTIN 1000 MCG TABS     • Calcium Carbonate-Vitamin D 600-200 MG-UNIT tablet CALCIUM 600+D 600-200 MG-UNIT TABS     • cyanocobalamin (VITAMIN B-12) 500 MCG tablet Take 625 mcg by mouth Daily.     • Flaxseed, Linseed, (Flax Seed Oil) 1000 MG capsule Take  by mouth.     • folic acid (FOLVITE) 400 MCG tablet Take 400 mcg by mouth Daily.     • Glucosamine-Chondroit-Vit  C-Mn (GLUCOSAMINE CHONDR 1500 COMPLX) capsule GLUCOSAMINE CHONDR 1500 COMPLX CAPS     • magnesium oxide 250 MG tablet MAGNESIUM OXIDE 250 MG TABS     • Multiple Vitamins-Minerals (MULTIVITAMIN ADULT) tablet Take  by mouth.     • Omega-3 Fatty Acids (fish oil) 1000 MG capsule capsule Take 1,000 mg by mouth Daily With Breakfast.     • potassium gluconate 595 (99 K) MG tablet tablet EQL POTASSIUM GLUCONATE 595 (99 K) MG ORAL TABLET     • levothyroxine (SYNTHROID, LEVOTHROID) 75 MCG tablet Take 1 tablet by mouth Daily. 90 tablet 0   • meloxicam (MOBIC) 15 MG tablet TAKE 1 TABLET BY MOUTH  DAILY 90 tablet 0   • Bromfenac Sodium (BromSite) 0.075 % solution Compounded drop, also includes Prednisilone Sodium Phosphate 1% and Moxifloxacin 0.5%     • moxifloxacin (VIGAMOX) 0.5 % ophthalmic solution Compounded drop, also includes Prednisilone Sodium Phosphate 1% and Bromfenac 0.075%       No facility-administered medications prior to visit.       No opioid medication identified on active medication list. I have reviewed chart for other potential  high risk medication/s and harmful drug interactions in the elderly.          Aspirin is on active medication list. Aspirin use is indicated based on review of current medical condition/s. Pros and cons of this therapy have been discussed today. Benefits of this medication outweigh potential harm.  Patient has been encouraged to continue taking this medication.  .      Patient Active Problem List   Diagnosis   • Abnormal mammogram   • Breast lump   • Mixed hyperlipidemia   • Acquired hypothyroidism   • Adenocarcinoma, breast (HCC)   • Arthritis   • Anemia   • Arthralgia   • Family history of colonic polyps   • Fatigue   • Hematuria   • Mixed stress and urge urinary incontinence   • Neuropathic pain   • Other sleep apnea   • Personal history of malignant neoplasm of breast   • Preoperative examination   • Restless leg syndrome   • Shingles   • Skin rash   • Snoring   • Toe pain   •  "Osteoarthritis   • Pneumonia   • Combined forms of age-related cataract of both eyes   • Encounter for annual wellness exam in Medicare patient   • Obesity (BMI 30.0-34.9)     Advance Care Planning  Advance Directive is on file.  ACP discussion was held with the patient during this visit. Patient has an advance directive in EMR which is still valid.     Review of Systems   Constitutional: Negative.    Respiratory: Negative.    Cardiovascular: Negative.    Musculoskeletal: Positive for arthralgias.   Neurological: Negative.    Psychiatric/Behavioral: Negative.         Objective    Vitals:    08/03/22 1044 08/03/22 1105   BP: 150/92 152/82   BP Location: Left arm    Patient Position: Sitting    Cuff Size: Large Adult    Pulse: 79    Temp: 98.2 °F (36.8 °C)    TempSrc: Temporal    SpO2: 94%    Weight: 80.7 kg (178 lb)    Height: 165.1 cm (65\")    PainSc: 0-No pain      Estimated body mass index is 29.62 kg/m² as calculated from the following:    Height as of this encounter: 165.1 cm (65\").    Weight as of this encounter: 80.7 kg (178 lb).    BMI is >= 25 and <30. (Overweight) The following options were offered after discussion;: exercise counseling/recommendations      Does the patient have evidence of cognitive impairment? No   MMSE done 30/30    Physical Exam  Vitals and nursing note reviewed.   Constitutional:       General: She is not in acute distress.     Appearance: Normal appearance. She is well-developed, well-groomed and overweight.   HENT:      Head: Normocephalic and atraumatic.   Neck:      Thyroid: No thyromegaly.   Cardiovascular:      Rate and Rhythm: Normal rate and regular rhythm.      Heart sounds: Normal heart sounds. No murmur heard.    No friction rub. No gallop.   Pulmonary:      Effort: Pulmonary effort is normal. No respiratory distress.      Breath sounds: Normal breath sounds. No wheezing or rales.   Musculoskeletal:      Cervical back: Neck supple.      Right lower leg: No edema.      Left " lower leg: No edema.   Lymphadenopathy:      Cervical: No cervical adenopathy.   Skin:     General: Skin is warm and dry.   Neurological:      Mental Status: She is alert and oriented to person, place, and time.   Psychiatric:         Mood and Affect: Mood normal.       Lab Results   Component Value Date    TRIG 40 08/01/2022    HDL 82 (H) 08/01/2022     (H) 08/01/2022    VLDL 8 08/01/2022     Lab Results   Component Value Date    GLUCOSE 90 08/01/2022    BUN 19 08/01/2022    CREATININE 0.72 08/01/2022    EGFRIFNONA 82 07/19/2021    BCR 26.4 (H) 08/01/2022    K 4.6 08/01/2022    CO2 26.0 08/01/2022    CALCIUM 9.1 08/01/2022    ALBUMIN 4.20 08/01/2022    LABIL2 1.5 01/07/2019    AST 11 08/01/2022    ALT 11 08/01/2022     Lab Results   Component Value Date    TSH 4.940 (H) 08/01/2022     Lab Results   Component Value Date    WBC 4.86 08/01/2022    HGB 13.0 08/01/2022    HCT 38.4 08/01/2022    MCV 88.3 08/01/2022     08/01/2022              HEALTH RISK ASSESSMENT    Smoking Status:  Social History     Tobacco Use   Smoking Status Never Smoker   Smokeless Tobacco Never Used     Alcohol Consumption:  Social History     Substance and Sexual Activity   Alcohol Use Yes   • Alcohol/week: 1.0 standard drink   • Types: 1 Glasses of wine per week    Comment: social- rarely, wine     Fall Risk Screen:    FELICIA Fall Risk Assessment was completed, and patient is at LOW risk for falls.Assessment completed on:8/3/2022    Depression Screening:  PHQ-2/PHQ-9 Depression Screening 8/3/2022   Retired PHQ-9 Total Score -   Retired Total Score -   Little Interest or Pleasure in Doing Things 0-->not at all   Feeling Down, Depressed or Hopeless 0-->not at all   PHQ-9: Brief Depression Severity Measure Score 0       Health Habits and Functional and Cognitive Screening:  Functional & Cognitive Status 8/3/2022   Do you have difficulty preparing food and eating? No   Do you have difficulty bathing yourself, getting dressed or  grooming yourself? No   Do you have difficulty using the toilet? No   Do you have difficulty moving around from place to place? No   Do you have trouble with steps or getting out of a bed or a chair? No   Current Diet Well Balanced Diet   Dental Exam Up to date   Eye Exam Up to date   Exercise (times per week) 5 times per week   Current Exercises Include Walking;Light Weights   Do you need help using the phone?  No   Are you deaf or do you have serious difficulty hearing?  No   Do you need help with transportation? No   Do you need help shopping? No   Do you need help preparing meals?  No   Do you need help with housework?  No   Do you need help with laundry? No   Do you need help taking your medications? No   Do you need help managing money? No   Do you ever drive or ride in a car without wearing a seat belt? No   Have you felt unusual stress, anger or loneliness in the last month? No   Who do you live with? Spouse   If you need help, do you have trouble finding someone available to you? No   Have you been bothered in the last four weeks by sexual problems? No   Do you have difficulty concentrating, remembering or making decisions? No       Age-appropriate Screening Schedule:  Refer to the list below for future screening recommendations based on patient's age, sex and/or medical conditions. Orders for these recommended tests are listed in the plan section. The patient has been provided with a written plan.    Health Maintenance   Topic Date Due   • TDAP/TD VACCINES (2 - Td or Tdap) 10/03/2021   • INFLUENZA VACCINE  10/01/2022   • LIPID PANEL  08/01/2023   • MAMMOGRAM  05/02/2024   • DXA SCAN  05/02/2024   • ZOSTER VACCINE  Completed              Assessment & Plan   CMS Preventative Services Quick Reference  Risk Factors Identified During Encounter  Obesity/Overweight   The above risks/problems have been discussed with the patient.  Follow up actions/plans if indicated are seen below in the Assessment/Plan  Section.  Pertinent information has been shared with the patient in the After Visit Summary.    Diagnoses and all orders for this visit:    1. Encounter for annual wellness exam in Medicare patient (Primary)    2. Mixed hyperlipidemia    3. Acquired hypothyroidism    4. Anemia, unspecified type    5. Osteoarthritis of multiple joints, unspecified osteoarthritis type    6. Family history of colonic polyps  -     Ambulatory Referral For Screening Colonoscopy    7. Screening for colon cancer  -     Ambulatory Referral For Screening Colonoscopy    Other orders  -     meloxicam (MOBIC) 15 MG tablet; Take 1 tablet by mouth Daily.  Dispense: 90 tablet; Refill: 3  -     levothyroxine (SYNTHROID, LEVOTHROID) 75 MCG tablet; Take 1 tablet by mouth Every Morning.  Dispense: 90 tablet; Refill: 3        Follow Up:   Return in about 1 year (around 8/3/2023) for Medicare Wellness.     An After Visit Summary and PPPS were made available to the patient.                 Labs were all reviewed.  She will continue her current medications.  She decided she wanted to stay with the meloxicam but if she changes her mind about the Celebrex she will let me know.  She was counseled on weight loss.  She is due her colonoscopy that was ordered.  She has a component of whitecoat hypertension.  I will see her back in a year.  Her oncologist orders her mammograms and DEXA scans.  I reviewed all her labs with her.  I sent refills on her meloxicam and her levothyroxine.

## 2023-03-22 RX ORDER — LEVOTHYROXINE SODIUM 0.07 MG/1
75 TABLET ORAL
Qty: 90 TABLET | Refills: 1 | Status: SHIPPED | OUTPATIENT
Start: 2023-03-22

## 2023-03-22 RX ORDER — MELOXICAM 15 MG/1
15 TABLET ORAL DAILY
Qty: 90 TABLET | Refills: 1 | Status: SHIPPED | OUTPATIENT
Start: 2023-03-22

## 2023-05-05 ENCOUNTER — HOSPITAL ENCOUNTER (OUTPATIENT)
Dept: MAMMOGRAPHY | Facility: HOSPITAL | Age: 73
Discharge: HOME | End: 2023-05-05
Admitting: INTERNAL MEDICINE
Payer: MEDICARE

## 2023-05-05 DIAGNOSIS — Z12.31 VISIT FOR SCREENING MAMMOGRAM: ICD-10-CM

## 2023-05-05 PROCEDURE — 77063 BREAST TOMOSYNTHESIS BI: CPT

## 2023-05-05 PROCEDURE — 77067 SCR MAMMO BI INCL CAD: CPT

## 2023-05-11 ENCOUNTER — TRANSCRIBE ORDERS (OUTPATIENT)
Dept: ADMINISTRATIVE | Facility: HOSPITAL | Age: 73
End: 2023-05-11
Payer: MEDICARE

## 2023-05-11 DIAGNOSIS — Z78.0 OSTEOPENIA AFTER MENOPAUSE: ICD-10-CM

## 2023-05-11 DIAGNOSIS — M85.80 OSTEOPENIA AFTER MENOPAUSE: ICD-10-CM

## 2023-05-11 DIAGNOSIS — Z12.31 VISIT FOR SCREENING MAMMOGRAM: Primary | ICD-10-CM

## 2023-07-27 ENCOUNTER — TELEPHONE (OUTPATIENT)
Dept: FAMILY MEDICINE CLINIC | Facility: CLINIC | Age: 73
End: 2023-07-27
Payer: MEDICARE

## 2023-07-27 DIAGNOSIS — E03.9 ACQUIRED HYPOTHYROIDISM: ICD-10-CM

## 2023-07-27 DIAGNOSIS — E78.2 MIXED HYPERLIPIDEMIA: Primary | ICD-10-CM

## 2023-07-27 NOTE — TELEPHONE ENCOUNTER
Caller: Gosia Gross    Relationship: Self    Best call back number:     Gosia Gross (Self) [phone] (Mobile)         What was the call regarding: WANTING TO HAVE LAB ORDERS PLACED AND NEEDING TO COME IN ON THE 7TH OF AUGUST / PRIOR TO APPT    Is it okay if the provider responds through [facility]: CAN YOU CALL AND LET HER KNOW

## 2023-08-07 ENCOUNTER — LAB (OUTPATIENT)
Dept: FAMILY MEDICINE CLINIC | Facility: CLINIC | Age: 73
End: 2023-08-07
Payer: MEDICARE

## 2023-08-08 ENCOUNTER — LAB (OUTPATIENT)
Dept: FAMILY MEDICINE CLINIC | Facility: CLINIC | Age: 73
End: 2023-08-08
Payer: MEDICARE

## 2023-08-08 DIAGNOSIS — E78.2 MIXED HYPERLIPIDEMIA: ICD-10-CM

## 2023-08-08 DIAGNOSIS — E03.9 ACQUIRED HYPOTHYROIDISM: ICD-10-CM

## 2023-08-08 LAB
ALBUMIN SERPL-MCNC: 4 G/DL (ref 3.5–5.2)
ALBUMIN/GLOB SERPL: 1.5 G/DL
ALP SERPL-CCNC: 107 U/L (ref 39–117)
ALT SERPL W P-5'-P-CCNC: 12 U/L (ref 1–33)
ANION GAP SERPL CALCULATED.3IONS-SCNC: 7.6 MMOL/L (ref 5–15)
AST SERPL-CCNC: 14 U/L (ref 1–32)
BILIRUB SERPL-MCNC: 0.2 MG/DL (ref 0–1.2)
BUN SERPL-MCNC: 22 MG/DL (ref 8–23)
BUN/CREAT SERPL: 28.6 (ref 7–25)
CALCIUM SPEC-SCNC: 9 MG/DL (ref 8.6–10.5)
CHLORIDE SERPL-SCNC: 106 MMOL/L (ref 98–107)
CHOLEST SERPL-MCNC: 209 MG/DL (ref 0–200)
CO2 SERPL-SCNC: 26.4 MMOL/L (ref 22–29)
CREAT SERPL-MCNC: 0.77 MG/DL (ref 0.57–1)
EGFRCR SERPLBLD CKD-EPI 2021: 81.1 ML/MIN/1.73
GLOBULIN UR ELPH-MCNC: 2.6 GM/DL
GLUCOSE SERPL-MCNC: 97 MG/DL (ref 65–99)
HDLC SERPL-MCNC: 86 MG/DL (ref 40–60)
LDLC SERPL CALC-MCNC: 116 MG/DL (ref 0–100)
LDLC/HDLC SERPL: 1.35 {RATIO}
POTASSIUM SERPL-SCNC: 4.1 MMOL/L (ref 3.5–5.2)
PROT SERPL-MCNC: 6.6 G/DL (ref 6–8.5)
SODIUM SERPL-SCNC: 140 MMOL/L (ref 136–145)
TRIGL SERPL-MCNC: 36 MG/DL (ref 0–150)
TSH SERPL DL<=0.05 MIU/L-ACNC: 4.57 UIU/ML (ref 0.27–4.2)
VLDLC SERPL-MCNC: 7 MG/DL (ref 5–40)

## 2023-08-08 PROCEDURE — 84443 ASSAY THYROID STIM HORMONE: CPT | Performed by: FAMILY MEDICINE

## 2023-08-08 PROCEDURE — 36415 COLL VENOUS BLD VENIPUNCTURE: CPT

## 2023-08-08 PROCEDURE — 80053 COMPREHEN METABOLIC PANEL: CPT | Performed by: FAMILY MEDICINE

## 2023-08-08 PROCEDURE — 80061 LIPID PANEL: CPT | Performed by: FAMILY MEDICINE

## 2023-08-10 ENCOUNTER — OFFICE VISIT (OUTPATIENT)
Dept: FAMILY MEDICINE CLINIC | Facility: CLINIC | Age: 73
End: 2023-08-10
Payer: MEDICARE

## 2023-08-10 VITALS
HEART RATE: 70 BPM | DIASTOLIC BLOOD PRESSURE: 88 MMHG | OXYGEN SATURATION: 96 % | HEIGHT: 65 IN | WEIGHT: 179 LBS | TEMPERATURE: 98 F | SYSTOLIC BLOOD PRESSURE: 151 MMHG | BODY MASS INDEX: 29.82 KG/M2

## 2023-08-10 DIAGNOSIS — E78.2 MIXED HYPERLIPIDEMIA: ICD-10-CM

## 2023-08-10 DIAGNOSIS — R21 RASH: ICD-10-CM

## 2023-08-10 DIAGNOSIS — Z12.11 SCREENING FOR COLON CANCER: ICD-10-CM

## 2023-08-10 DIAGNOSIS — E03.9 ACQUIRED HYPOTHYROIDISM: ICD-10-CM

## 2023-08-10 DIAGNOSIS — Z00.00 ENCOUNTER FOR ANNUAL WELLNESS EXAM IN MEDICARE PATIENT: Primary | ICD-10-CM

## 2023-08-10 DIAGNOSIS — Z83.71 FAMILY HISTORY OF COLONIC POLYPS: ICD-10-CM

## 2023-08-10 PROBLEM — Z86.69 HISTORY OF RETINAL TEAR: Status: ACTIVE | Noted: 2022-06-07

## 2023-08-10 PROBLEM — H18.523 ANTERIOR BASEMENT MEMBRANE DYSTROPHY OF BOTH EYES: Status: ACTIVE | Noted: 2022-06-07

## 2023-08-10 PROBLEM — Z96.1 PSEUDOPHAKIA OF LEFT EYE: Status: ACTIVE | Noted: 2021-08-16

## 2023-08-10 PROBLEM — H43.819 POSTERIOR VITREOUS DETACHMENT: Status: ACTIVE | Noted: 2022-06-07

## 2023-08-10 PROBLEM — H25.10 NUCLEAR SCLEROSIS: Status: ACTIVE | Noted: 2021-12-07

## 2023-08-10 PROBLEM — H33.312 RETINAL TEAR OF LEFT EYE: Status: ACTIVE | Noted: 2022-06-07

## 2023-08-10 PROBLEM — H35.342 MACULAR HOLE OF LEFT EYE: Status: ACTIVE | Noted: 2021-08-16

## 2023-08-10 PROBLEM — H40.059 OCULAR HYPERTENSION: Status: ACTIVE | Noted: 2021-08-16

## 2023-08-10 RX ORDER — MELOXICAM 15 MG/1
15 TABLET ORAL DAILY
Qty: 90 TABLET | Refills: 3 | Status: SHIPPED | OUTPATIENT
Start: 2023-08-10

## 2023-08-10 RX ORDER — LEVOTHYROXINE SODIUM 0.07 MG/1
75 TABLET ORAL
Qty: 90 TABLET | Refills: 3 | Status: SHIPPED | OUTPATIENT
Start: 2023-08-10

## 2023-08-10 RX ORDER — MULTIPLE VITAMINS W/ MINERALS TAB 9MG-400MCG
2 TAB ORAL DAILY
COMMUNITY

## 2023-08-10 NOTE — PROGRESS NOTES
The ABCs of the Annual Wellness Visit  Subsequent Medicare Wellness Visit    Subjective    Gosia Gross is a 73 y.o. female who presents for a Subsequent Medicare Wellness Visit.    The following portions of the patient's history were reviewed and   updated as appropriate: allergies, current medications, past family history, past medical history, past social history, past surgical history, and problem list.    Compared to one year ago, the patient feels her physical   health is worse.    Compared to one year ago, the patient feels her mental   health is the same.    Recent Hospitalizations:  She was not admitted to the hospital during the last year.       Current Medical Providers:  Patient Care Team:  Genny Hammond MD as PCP - General (Family Medicine)  Genny Hammond MD as PCP - Family Medicine    Outpatient Medications Prior to Visit   Medication Sig Dispense Refill    aspirin 81 MG tablet ASPIRIN 81 MG ORAL TABLET      Bioflavonoid Products (VITAMIN C PLUS) 500 MG tablet VITAMIN C PLUS 500 MG TABS      Calcium Carbonate-Vitamin D 600-200 MG-UNIT tablet Take 2 tablets by mouth Daily.      cyanocobalamin (VITAMIN B-12) 500 MCG tablet Take 625 mcg by mouth Daily.      Flaxseed, Linseed, (Flax Seed Oil) 1000 MG capsule Take  by mouth.      Glucosamine-Chondroit-Vit C-Mn (GLUCOSAMINE CHONDR 1500 COMPLX) capsule Take 2 capsules by mouth Daily.      magnesium oxide 250 MG tablet MAGNESIUM OXIDE 250 MG TABS      Multiple Vitamins-Minerals (MULTIVITAMIN ADULT) tablet Take  by mouth.      multivitamin with minerals (EYE VITAMINS & MINERALS PO) Take 2 tablets by mouth Daily.      levothyroxine (SYNTHROID, LEVOTHROID) 75 MCG tablet Take 1 tablet by mouth Every Morning. 90 tablet 1    meloxicam (MOBIC) 15 MG tablet Take 1 tablet by mouth Daily. 90 tablet 1    Biotin 1000 MCG tablet BIOTIN 1000 MCG TABS      folic acid (FOLVITE) 400 MCG tablet Take 400 mcg by mouth Daily. (Patient not taking:  Reported on 8/10/2023)      Omega-3 Fatty Acids (fish oil) 1000 MG capsule capsule Take 1,000 mg by mouth Daily With Breakfast. (Patient not taking: Reported on 8/10/2023)      potassium gluconate 595 (99 K) MG tablet tablet EQL POTASSIUM GLUCONATE 595 (99 K) MG ORAL TABLET (Patient not taking: Reported on 8/10/2023)      promethazine-dextromethorphan (PROMETHAZINE-DM) 6.25-15 MG/5ML syrup Take 5 mL by mouth At Night As Needed for Cough. (Patient not taking: Reported on 8/10/2023) 90 mL 0     No facility-administered medications prior to visit.       No opioid medication identified on active medication list. I have reviewed chart for other potential  high risk medication/s and harmful drug interactions in the elderly.        Aspirin is on active medication list. Aspirin use is indicated based on review of current medical condition/s. Pros and cons of this therapy have been discussed today. Benefits of this medication outweigh potential harm.  Patient has been encouraged to continue taking this medication.  .      Patient Active Problem List   Diagnosis    Abnormal mammogram    Breast lump    Mixed hyperlipidemia    Acquired hypothyroidism    Adenocarcinoma, breast    Arthritis    Anemia    Arthralgia    Family history of colonic polyps    Fatigue    Hematuria    Mixed stress and urge urinary incontinence    Neuropathic pain    Other sleep apnea    Personal history of malignant neoplasm of breast    Preoperative examination    Restless leg syndrome    Shingles    Skin rash    Snoring    Toe pain    Osteoarthritis    Pneumonia    Combined forms of age-related cataract of both eyes    Encounter for annual wellness exam in Medicare patient    Obesity (BMI 30.0-34.9)    Anterior basement membrane dystrophy of both eyes    History of retinal tear    Macular hole of left eye    Nuclear sclerosis    Ocular hypertension    Posterior vitreous detachment    Pseudophakia of left eye    Retinal tear of left eye     Advance Care  "Planning   Advance Care Planning     Advance Directive is on file.  ACP discussion was held with the patient during this visit. Patient has an advance directive in EMR which is still valid.      Objective    Vitals:    08/10/23 1044   BP: 151/88   BP Location: Left arm   Patient Position: Sitting   Cuff Size: Large Adult   Pulse: 70   Temp: 98 øF (36.7 øC)   TempSrc: Temporal   SpO2: 96%   Weight: 81.2 kg (179 lb)   Height: 165.1 cm (65\")   PainSc: 0-No pain     Estimated body mass index is 29.79 kg/mý as calculated from the following:    Height as of this encounter: 165.1 cm (65\").    Weight as of this encounter: 81.2 kg (179 lb).    BMI is >= 25 and <30. (Overweight) The following options were offered after discussion;: nutrition counseling/recommendations      Does the patient have evidence of cognitive impairment? No    Lab Results   Component Value Date    TRIG 36 2023    HDL 86 (H) 2023     (H) 2023    VLDL 7 2023        HEALTH RISK ASSESSMENT    Smoking Status:  Social History     Tobacco Use   Smoking Status Never   Smokeless Tobacco Never     Alcohol Consumption:  Social History     Substance and Sexual Activity   Alcohol Use Yes    Alcohol/week: 1.0 standard drink    Types: 1 Glasses of wine per week    Comment: social- rarely, wine     Fall Risk Screen:    FELICIA Fall Risk Assessment was completed, and patient is at LOW risk for falls.Assessment completed on:8/10/2023    Depression Screenin/10/2023    10:53 AM   PHQ-2/PHQ-9 Depression Screening   Little Interest or Pleasure in Doing Things 0-->not at all   Feeling Down, Depressed or Hopeless 0-->not at all   PHQ-9: Brief Depression Severity Measure Score 0       Health Habits and Functional and Cognitive Screenin/10/2023    10:54 AM   Functional & Cognitive Status   Do you have difficulty preparing food and eating? No   Do you have difficulty bathing yourself, getting dressed or grooming yourself? No   Do " you have difficulty using the toilet? No   Do you have difficulty moving around from place to place? No   Do you have trouble with steps or getting out of a bed or a chair? No   Current Diet Well Balanced Diet   Dental Exam Up to date   Eye Exam Up to date   Exercise (times per week) 2 times per week   Current Exercises Include Walking   Do you need help using the phone?  No   Are you deaf or do you have serious difficulty hearing?  No   Do you need help to go to places out of walking distance? No   Do you need help shopping? No   Do you need help preparing meals?  No   Do you need help with housework?  No   Do you need help with laundry? No   Do you need help taking your medications? No   Do you need help managing money? No   Do you ever drive or ride in a car without wearing a seat belt? No   Have you felt unusual stress, anger or loneliness in the last month? No   Who do you live with? Spouse   If you need help, do you have trouble finding someone available to you? No   Have you been bothered in the last four weeks by sexual problems? No   Do you have difficulty concentrating, remembering or making decisions? No       Age-appropriate Screening Schedule:  Refer to the list below for future screening recommendations based on patient's age, sex and/or medical conditions. Orders for these recommended tests are listed in the plan section. The patient has been provided with a written plan.    Health Maintenance   Topic Date Due    COLORECTAL CANCER SCREENING  10/27/2016    TDAP/TD VACCINES (2 - Td or Tdap) 10/03/2021    COVID-19 Vaccine (6 - Moderna series) 02/19/2023    INFLUENZA VACCINE  10/01/2023    DXA SCAN  05/02/2024    LIPID PANEL  08/08/2024    ANNUAL WELLNESS VISIT  08/10/2024    MAMMOGRAM  05/05/2025    HEPATITIS C SCREENING  Completed    Pneumococcal Vaccine 65+  Completed    ZOSTER VACCINE  Completed              Her oncologist has ordered her mammogram and DEXA scan  I have ordered her colonoscopy  She  was encouraged to talk to the pharmacist about getting a Tdap  She was counseled on the need for a flu shot this fall  We also discussed RSV and COVID vaccines    CMS Preventative Services Quick Reference  Risk Factors Identified During Encounter  Immunizations Discussed/Encouraged: Tdap  The above risks/problems have been discussed with the patient.  Pertinent information has been shared with the patient in the After Visit Summary.  An After Visit Summary and PPPS were made available to the patient.    Follow Up:   Next Medicare Wellness visit to be scheduled in 1 year.       Additional E&M Note during same encounter follows:  Patient has multiple medical problems which are significant and separately identifiable that require additional work above and beyond the Medicare Wellness Visit.      Chief Complaint  Medicare Wellness-subsequent, Rash (All over. Possible heat rash. Upper body. ), Eczema (Inside her ear and causes soreness. Anything she can use for that? Her mother had it. ), Hypothyroidism (Wants to discuss results. Possibly need to raise dose. ), and Hypertension (Home bp readings 106-130/56-83)    Subjective        HPI  Gosia Gross is also being seen today for follow  up on chol and thyroid  Needs refills  Labs done prior to appt  Rash present for a month  Not pruritic  No change in meds or skin products/detergents  Some stress as she is caring for her 102-year-old father who lives in [place]  She is also currently renovating some rooms in her house  She is not as physically active as she has been in the past she is not as physically active as she has been in the past    Review of Systems   Constitutional: Negative.    Respiratory: Negative.     Cardiovascular: Negative.    Skin:  Positive for rash.   Neurological: Negative.    Psychiatric/Behavioral: Negative.       Objective   Vital Signs:  /88 (BP Location: Left arm, Patient Position: Sitting, Cuff Size: Large Adult)   Pulse 70   Temp 98 øF  "(36.7 øC) (Temporal)   Ht 165.1 cm (65\")   Wt 81.2 kg (179 lb)   SpO2 96%   BMI 29.79 kg/mý     Physical Exam  Vitals and nursing note reviewed.   Constitutional:       Appearance: Normal appearance. She is well-developed, well-groomed and overweight.   Cardiovascular:      Rate and Rhythm: Normal rate and regular rhythm.      Heart sounds: Normal heart sounds.   Pulmonary:      Effort: Pulmonary effort is normal.      Breath sounds: Normal breath sounds.   Musculoskeletal:      Right lower leg: No edema.      Left lower leg: No edema.   Skin:     Comments: [place]-colored extensive papular rash extending all down the back in various sizes; almost in a Lewis tree like pattern  Pale similar version that is not as close on both upper arms and across her chest and abdomen  There is no obvious herald patch   Neurological:      Mental Status: She is alert and oriented to person, place, and time.   Psychiatric:         Mood and Affect: Mood normal.         Behavior: Behavior is cooperative.                  Lab Results   Component Value Date    GLUCOSE 97 08/08/2023    BUN 22 08/08/2023    CREATININE 0.77 08/08/2023    EGFR 81.1 08/08/2023    BCR 28.6 (H) 08/08/2023    K 4.1 08/08/2023    CO2 26.4 08/08/2023    CALCIUM 9.0 08/08/2023    ALBUMIN 4.0 08/08/2023    BILITOT 0.2 08/08/2023    AST 14 08/08/2023    ALT 12 08/08/2023     Lab Results   Component Value Date    TSH 4.570 (H) 08/08/2023     Lab Results   Component Value Date    CHOL 209 (H) 08/08/2023    TRIG 36 08/08/2023    HDL 86 (H) 08/08/2023     (H) 08/08/2023          Assessment and Plan   Diagnoses and all orders for this visit:    1. Encounter for annual wellness exam in Medicare patient (Primary)    2. Mixed hyperlipidemia    3. Acquired hypothyroidism    4. Family history of colonic polyps    5. Screening for colon cancer  -     Ambulatory Referral For Screening Colonoscopy    6. Rash  -     Ambulatory Referral to Dermatology    Other " orders  -     levothyroxine (SYNTHROID, LEVOTHROID) 75 MCG tablet; Take 1 tablet by mouth Every Morning.  Dispense: 90 tablet; Refill: 3  -     meloxicam (MOBIC) 15 MG tablet; Take 1 tablet by mouth Daily.  Dispense: 90 tablet; Refill: 3    Hyperlipidemia: Recent lipid panel was reviewed with her  Acquired hypothyroidism: Recent TSH was reviewed with her and I feel she would do well to continue her current dosing and it was refilled  Family history of colon polyps and screening for colon cancer: Her colonoscopy was ordered  Rash: It looks suspicious for pityriasis rosea but since it is not 100% classic and sending her to dermatology for further evaluation.  I offered steroids but she did not want those         Follow Up   Return in about 1 year (around 8/10/2024) for Recheck, Medicare Wellness.  Patient was given instructions and counseling regarding her condition or for health maintenance advice. Please see specific information pulled into the AVS if appropriate.

## 2023-08-10 NOTE — PATIENT INSTRUCTIONS
Keep working to lose weight through healthy eating and exercise.   Talk to pharmacist about getting an updated tetanus with  whooping cough (tdap)  Get a flu  shot this Fall

## 2023-08-17 ENCOUNTER — TELEPHONE (OUTPATIENT)
Dept: FAMILY MEDICINE CLINIC | Facility: CLINIC | Age: 73
End: 2023-08-17

## 2023-08-17 NOTE — TELEPHONE ENCOUNTER
Caller: Gosia Gross    Relationship: Self    Best call back number: [phone]    What is the best time to reach you: ANY     Who are you requesting to speak with (clinical staff, provider,  specific staff member): ENEDELIA     What was the call regarding: PATIENT STATES THAT SHE WAS TRYING TO GET A REFERRAL FOR DR. ROTHMAN, A DERMATOLOGIST. PATIENT STATES THAT SHE SPOKE WITH ENEDELIA THIS MORNING AND SHE ADVISED THAT DR. ROTHMAN DID NOT TAKE [employer]. PATIENT STATES THAT SHE CALLED OVER TO THEIR OFFICE AND THEY ADVISED THAT THEY DO TAKE HER INSURANCE. PATIENT WOULD LIKE FOR ENEDELIA TO GIVE HER A CALL TO DISCUSS THIS ASAP.

## 2024-01-11 ENCOUNTER — TELEPHONE (OUTPATIENT)
Dept: ONCOLOGY | Facility: CLINIC | Age: 74
End: 2024-01-11
Payer: MEDICARE

## 2024-03-21 RX ORDER — MELOXICAM 15 MG/1
15 TABLET ORAL DAILY
Qty: 90 TABLET | Refills: 0 | Status: SHIPPED | OUTPATIENT
Start: 2024-03-21

## 2024-03-21 RX ORDER — LEVOTHYROXINE SODIUM 0.07 MG/1
75 TABLET ORAL
Qty: 90 TABLET | Refills: 0 | Status: SHIPPED | OUTPATIENT
Start: 2024-03-21

## 2024-03-21 NOTE — TELEPHONE ENCOUNTER
blanka Chamorro    Caller: Gosia Gross    Relationship: Self    Best call back number: [phone]     Requested Prescriptions:   Requested Prescriptions     Pending Prescriptions Disp Refills    meloxicam (MOBIC) 15 MG tablet 90 tablet 3     Sig: Take 1 tablet by mouth Daily.    levothyroxine (SYNTHROID, LEVOTHROID) 75 MCG tablet 90 tablet 3     Sig: Take 1 tablet by mouth Every Morning.        Pharmacy where request should be sent: [facility] [place] - [address] [facility] [address] AT PORTAL TO [facility] - [phone]  - [phone] FX     Last office visit with prescribing clinician: 8/10/2023   Last telemedicine visit with prescribing clinician: Visit date not found   Next office visit with prescribing clinician: 8/20/2024     Additional details provided by patient:   NEED NEW PRESCRIPTIONS FOR 90 DAYS    Does the patient have less than a 3 day supply:  [] Yes  [] No    Would you like a call back once the refill request has been completed: [] Yes [] No    If the office needs to give you a call back, can they leave a voicemail: [] Yes [] No    Chung Lizama   03/21/24 13:43 EDT

## 2024-05-09 ENCOUNTER — HOSPITAL ENCOUNTER (OUTPATIENT)
Dept: MAMMOGRAPHY | Facility: HOSPITAL | Age: 74
Discharge: HOME | End: 2024-05-09
Payer: MEDICARE

## 2024-05-09 ENCOUNTER — HOSPITAL ENCOUNTER (OUTPATIENT)
Dept: BONE DENSITY | Facility: HOSPITAL | Age: 74
Discharge: HOME | End: 2024-05-09
Payer: MEDICARE

## 2024-05-09 DIAGNOSIS — M85.80 OSTEOPENIA AFTER MENOPAUSE: ICD-10-CM

## 2024-05-09 DIAGNOSIS — Z12.31 VISIT FOR SCREENING MAMMOGRAM: ICD-10-CM

## 2024-05-09 DIAGNOSIS — Z78.0 OSTEOPENIA AFTER MENOPAUSE: ICD-10-CM

## 2024-05-09 PROCEDURE — 77067 SCR MAMMO BI INCL CAD: CPT

## 2024-05-09 PROCEDURE — 77063 BREAST TOMOSYNTHESIS BI: CPT

## 2024-05-09 PROCEDURE — 77080 DXA BONE DENSITY AXIAL: CPT

## 2024-05-10 NOTE — PROGRESS NOTES
Hematology/Oncology Outpatient Consultation    Patient name: Gosia Gross  : 1950  MRN: [id number]  Primary Care Physician: Genny Hammond MD  Referring Physician: Sukhdev Beard MD  Reason For Consult:     Chief Complaint   Patient presents with    Appointment     Breast Cancer       History of Present Illness:    This is a 74-year-old female has a history of stage Ia right lateral triple negative breast cancer diagnosed in 2013.  Patient underwent needle localized partial mastectomy and right sentinel lymph node biopsy on 2013.  The pathology revealed 6 mm focus of invasive mammary carcinoma with 2 axillary lymph nodes negative for metastatic disease.  Patient received adjuvant breast radiation.  She had BRCA 1 and 2 genetic test which was - 2014    She also has a history of iron deficiency anemia  She has not had any evidence of disease to date and she is followed on an annual basis.  Her last screening mammogram was May 9, 2024 and was normal follow-up in 1 year was recommended    Maternal aunt had ovarian cancer at age 50      Patient has one daughter    She does not smoke or drink alcohol    She is retired from customer service    Past Medical History:   Diagnosis Date    Arthritis 2000    Breast cancer 2013    Right breast    Cancer     Rt breast cancer    Cataract 2021    surgery set up for & 8/10,  2021    Hx of radiation therapy 2013    Right breast cancer--6 tx's    Hyperlipidemia     Hypertension     Hypothyroidism 2010    Osteopenia 2005    osteopenia       Past Surgical History:   Procedure Laterality Date    BREAST BIOPSY Right 2013    prone stereotactic core bx     BREAST LUMPECTOMY Right 2013    Rt breast    COLONOSCOPY      yes not sure of date    HYSTERECTOMY      JOINT REPLACEMENT      bilateral knees in 2013    LYMPH NODE BIOPSY      TUBAL ABDOMINAL LIGATION           Current Outpatient Medications:     aspirin 81 MG  tablet, ASPIRIN 81 MG ORAL TABLET, Disp: , Rfl:     Bioflavonoid Products (VITAMIN C PLUS) 500 MG tablet, VITAMIN C PLUS 500 MG TABS, Disp: , Rfl:     Calcium Carbonate-Vitamin D 600-200 MG-UNIT tablet, Take 2 tablets by mouth Daily., Disp: , Rfl:     cyanocobalamin (VITAMIN B-12) 500 MCG tablet, Take 625 mcg by mouth Daily., Disp: , Rfl:     Flaxseed, Linseed, (Flax Seed Oil) 1000 MG capsule, Take  by mouth., Disp: , Rfl:     Glucosamine-Chondroit-Vit C-Mn (GLUCOSAMINE CHONDR 1500 COMPLX) capsule, Take 2 capsules by mouth Daily., Disp: , Rfl:     levothyroxine (SYNTHROID, LEVOTHROID) 75 MCG tablet, Take 1 tablet by mouth Every Morning., Disp: 90 tablet, Rfl: 0    magnesium oxide 250 MG tablet, MAGNESIUM OXIDE 250 MG TABS, Disp: , Rfl:     meloxicam (MOBIC) 15 MG tablet, Take 1 tablet by mouth Daily., Disp: 90 tablet, Rfl: 0    Multiple Vitamins-Minerals (MULTIVITAMIN ADULT) tablet, Take  by mouth., Disp: , Rfl:     multivitamin with minerals (EYE VITAMINS & MINERALS PO), Take 2 tablets by mouth Daily., Disp: , Rfl:     Allergies   Allergen Reactions    Ropinirole Hcl Nausea And Vomiting       Immunization History   Administered Date(s) Administered    COVID-19 (MODERNA) 1st,2nd,3rd Dose Monovalent 01/28/2021, 02/25/2021    COVID-19 (MODERNA) BIVALENT 12+YRS 10/19/2022    COVID-19 (MODERNA) Monovalent Original Booster 11/03/2021, 04/04/2022    Fluzone High Dose =>65 Years ([employer] ONLY) 10/16/2014, 10/01/2015, 10/11/2016, 10/02/2017, 10/22/2018, 09/26/2019    Fluzone High-Dose 65+yrs 10/22/2020, 10/07/2021    H1N1 Inj 12/19/2009    Pneumococcal Conjugate 13-Valent (PCV13) 12/16/2015    Pneumococcal Polysaccharide (PPSV23) 01/28/2014, 12/20/2016    Shingrix 02/18/2020, 06/09/2020    Tdap 10/03/2011    Zostavax 11/07/2011       Family History   Problem Relation Age of Onset    Breast cancer Maternal Aunt         50's    Arthritis Father     Hyperlipidemia Father     Thyroid disease Father     Vision loss Father      "Hyperlipidemia Mother        Cancer-related family history includes Breast cancer in her maternal aunt.    Social History     Tobacco Use    Smoking status: Never    Smokeless tobacco: Never   Vaping Use    Vaping status: Never Used   Substance Use Topics    Alcohol use: Yes     Alcohol/week: 1.0 standard drink of alcohol     Types: 1 Glasses of wine per week     Comment: social- rarely, wine    Drug use: No       ROS:    Review of Systems   Constitutional:  Negative for chills, fatigue and fever.   HENT:  Negative for congestion, drooling, ear discharge, rhinorrhea, sinus pressure and tinnitus.    Eyes:  Negative for photophobia, pain and discharge.   Respiratory:  Negative for apnea, choking and stridor.    Cardiovascular:  Negative for palpitations.   Gastrointestinal:  Negative for abdominal distention, abdominal pain and anal bleeding.   Endocrine: Negative for polydipsia and polyphagia.   Genitourinary:  Negative for decreased urine volume, flank pain and genital sores.   Musculoskeletal:  Negative for gait problem, neck pain and neck stiffness.   Skin:  Negative for color change, rash and wound.   Neurological:  Negative for tremors, seizures, syncope, facial asymmetry and speech difficulty.   Hematological:  Negative for adenopathy.   Psychiatric/Behavioral:  Negative for agitation, confusion, hallucinations and self-injury. The patient is not hyperactive.        Objective:    Vitals:    05/16/24 1331   BP: 159/81   Pulse: 64   Resp: 18   Temp: 98.2 °F (36.8 °C)   TempSrc: Infrared   SpO2: 98%   Weight: 81.2 kg (179 lb)   Height: 160 cm (63\")   PainSc: 0-No pain   PainLoc: Generalized     Body mass index is 31.71 kg/m².    ECOG  (0) Fully active, able to carry on all predisease performance without restriction    Physical Exam:  Physical Exam  Vitals and nursing note reviewed.   Constitutional:       General: She is not in acute distress.     Appearance: She is not diaphoretic.   HENT:      Head: " Normocephalic and atraumatic.   Eyes:      General: No scleral icterus.        Right eye: No discharge.         Left eye: No discharge.      Conjunctiva/sclera: Conjunctivae normal.   Neck:      Thyroid: No thyromegaly.   Cardiovascular:      Rate and Rhythm: Normal rate and regular rhythm.      Heart sounds: Normal heart sounds.      No friction rub. No gallop.   Pulmonary:      Effort: Pulmonary effort is normal. No respiratory distress.      Breath sounds: No stridor. No wheezing.   Abdominal:      General: Bowel sounds are normal.      Palpations: Abdomen is soft. There is no mass.      Tenderness: There is no abdominal tenderness. There is no guarding or rebound.   Musculoskeletal:         General: No tenderness. Normal range of motion.      Cervical back: Normal range of motion and neck supple.   Lymphadenopathy:      Cervical: No cervical adenopathy.   Skin:     General: Skin is warm.      Findings: No erythema or rash.   Neurological:      Mental Status: She is alert and oriented to person, place, and time.      Motor: No abnormal muscle tone.   Psychiatric:         Behavior: Behavior normal.         RECENT LABS  WBC   Date Value Ref Range Status   05/16/2024 5.11 3.40 - 10.80 10*3/mm3 Final     RBC   Date Value Ref Range Status   05/16/2024 4.51 3.77 - 5.28 10*6/mm3 Final     Hemoglobin   Date Value Ref Range Status   05/16/2024 13.7 12.0 - 15.9 g/dL Final     Hematocrit   Date Value Ref Range Status   05/16/2024 42.2 34.0 - 46.6 % Final     MCV   Date Value Ref Range Status   05/16/2024 93.6 79.0 - 97.0 fL Final     MCH   Date Value Ref Range Status   05/16/2024 30.4 26.6 - 33.0 pg Final     MCHC   Date Value Ref Range Status   05/16/2024 32.5 31.5 - 35.7 g/dL Final     RDW   Date Value Ref Range Status   05/16/2024 12.5 12.3 - 15.4 % Final     RDW-SD   Date Value Ref Range Status   05/16/2024 42.1 37.0 - 54.0 fl Final     MPV   Date Value Ref Range Status   05/16/2024 9.7 6.0 - 12.0 fL Final     Platelets    Date Value Ref Range Status   05/16/2024 232 140 - 450 10*3/mm3 Final     Neutrophil %   Date Value Ref Range Status   05/16/2024 47.7 42.7 - 76.0 % Final     Lymphocyte %   Date Value Ref Range Status   05/16/2024 42.7 19.6 - 45.3 % Final     Monocyte %   Date Value Ref Range Status   05/16/2024 8.6 5.0 - 12.0 % Final     Eosinophil %   Date Value Ref Range Status   05/16/2024 1.0 0.3 - 6.2 % Final     Basophil %   Date Value Ref Range Status   05/16/2024 0.0 0.0 - 1.5 % Final     Immature Grans %   Date Value Ref Range Status   08/01/2022 0.0 0.0 - 0.5 % Final     Neutrophils, Absolute   Date Value Ref Range Status   05/16/2024 2.44 1.70 - 7.00 10*3/mm3 Final     Lymphocytes, Absolute   Date Value Ref Range Status   05/16/2024 2.18 0.70 - 3.10 10*3/mm3 Final     Monocytes, Absolute   Date Value Ref Range Status   05/16/2024 0.44 0.10 - 0.90 10*3/mm3 Final     Eosinophils, Absolute   Date Value Ref Range Status   05/16/2024 0.05 0.00 - 0.40 10*3/mm3 Final     Basophils, Absolute   Date Value Ref Range Status   05/16/2024 0.00 0.00 - 0.20 10*3/mm3 Final     Immature Grans, Absolute   Date Value Ref Range Status   08/01/2022 0.00 0.00 - 0.05 10*3/mm3 Final     nRBC   Date Value Ref Range Status   08/01/2022 0.0 0.0 - 0.2 /100 WBC Final       Lab Results   Component Value Date    GLUCOSE 97 08/08/2023    BUN 22 08/08/2023    CREATININE 0.77 08/08/2023    EGFRIFNONA 82 07/19/2021    BCR 28.6 (H) 08/08/2023    K 4.1 08/08/2023    CO2 26.4 08/08/2023    CALCIUM 9.0 08/08/2023    ALBUMIN 4.0 08/08/2023    LABIL2 1.5 01/07/2019    AST 14 08/08/2023    ALT 12 08/08/2023         Assessment & Plan   Adenocarcinoma of breast, unspecified laterality  - CBC & Differential      History of stage Ia triple negative breast cancer T1b N0 M0 diagnosed in 2013 status post right lumpectomy sentinel lymph node biopsy.  This was followed by adjuvant right breast radiation.  History of iron deficiency anemia  Dense breast tissue: Patient  is interested in utilizing breast MRI for her screening  Family history of malignancies as well as personal history: Referred to genetic counselor          Plans    Schedule breast MRI due now  Continue annual mammogram surveillance images  Genetic counselor referral  [facility] today  CBC reviewed  Follow-up 3 months or earlier as needed            I spent 60 total minutes, reviewing records formulating management plans face-to-face, caring for Gosia today. 90% of this time involved counseling and/or coordination of care as documented within this note.

## 2024-05-16 ENCOUNTER — CONSULT (OUTPATIENT)
Dept: ONCOLOGY | Facility: CLINIC | Age: 74
End: 2024-05-16
Payer: MEDICARE

## 2024-05-16 ENCOUNTER — LAB (OUTPATIENT)
Dept: LAB | Facility: HOSPITAL | Age: 74
End: 2024-05-16
Payer: MEDICARE

## 2024-05-16 VITALS
RESPIRATION RATE: 18 BRPM | HEIGHT: 63 IN | DIASTOLIC BLOOD PRESSURE: 81 MMHG | HEART RATE: 64 BPM | WEIGHT: 179 LBS | SYSTOLIC BLOOD PRESSURE: 159 MMHG | TEMPERATURE: 98.2 F | OXYGEN SATURATION: 98 % | BODY MASS INDEX: 31.71 KG/M2

## 2024-05-16 DIAGNOSIS — C50.919 ADENOCARCINOMA OF BREAST, UNSPECIFIED LATERALITY: Primary | ICD-10-CM

## 2024-05-16 DIAGNOSIS — R92.30 DENSE BREAST TISSUE: ICD-10-CM

## 2024-05-16 DIAGNOSIS — R92.8 ABNORMAL MAMMOGRAM: ICD-10-CM

## 2024-05-16 LAB
BASOPHILS # BLD AUTO: 0 10*3/MM3 (ref 0–0.2)
BASOPHILS NFR BLD AUTO: 0 % (ref 0–1.5)
DEPRECATED RDW RBC AUTO: 42.1 FL (ref 37–54)
EOSINOPHIL # BLD AUTO: 0.05 10*3/MM3 (ref 0–0.4)
EOSINOPHIL NFR BLD AUTO: 1 % (ref 0.3–6.2)
ERYTHROCYTE [DISTWIDTH] IN BLOOD BY AUTOMATED COUNT: 12.5 % (ref 12.3–15.4)
HCT VFR BLD AUTO: 42.2 % (ref 34–46.6)
HGB BLD-MCNC: 13.7 G/DL (ref 12–15.9)
HOLD SPECIMEN: NORMAL
HOLD SPECIMEN: NORMAL
LYMPHOCYTES # BLD AUTO: 2.18 10*3/MM3 (ref 0.7–3.1)
LYMPHOCYTES NFR BLD AUTO: 42.7 % (ref 19.6–45.3)
MCH RBC QN AUTO: 30.4 PG (ref 26.6–33)
MCHC RBC AUTO-ENTMCNC: 32.5 G/DL (ref 31.5–35.7)
MCV RBC AUTO: 93.6 FL (ref 79–97)
MONOCYTES # BLD AUTO: 0.44 10*3/MM3 (ref 0.1–0.9)
MONOCYTES NFR BLD AUTO: 8.6 % (ref 5–12)
NEUTROPHILS NFR BLD AUTO: 2.44 10*3/MM3 (ref 1.7–7)
NEUTROPHILS NFR BLD AUTO: 47.7 % (ref 42.7–76)
PLATELET # BLD AUTO: 232 10*3/MM3 (ref 140–450)
PMV BLD AUTO: 9.7 FL (ref 6–12)
RBC # BLD AUTO: 4.51 10*6/MM3 (ref 3.77–5.28)
WBC NRBC COR # BLD AUTO: 5.11 10*3/MM3 (ref 3.4–10.8)

## 2024-05-16 PROCEDURE — 36415 COLL VENOUS BLD VENIPUNCTURE: CPT

## 2024-05-16 PROCEDURE — 85025 COMPLETE CBC W/AUTO DIFF WBC: CPT

## 2024-06-03 ENCOUNTER — TELEPHONE (OUTPATIENT)
Dept: GENETICS | Facility: HOSPITAL | Age: 74
End: 2024-06-03
Payer: MEDICARE

## 2024-06-03 NOTE — TELEPHONE ENCOUNTER
Pt called regarding previous genetic test results prior to an upcoming appt. Pt does not have a copy of her previous results but gives us permission to access them and asked us to email her a copy if we had one. I do not currently see it in her chart and pt does not recall the lab she used.

## 2024-06-11 ENCOUNTER — HOSPITAL ENCOUNTER (OUTPATIENT)
Dept: MRI IMAGING | Facility: HOSPITAL | Age: 74
Discharge: HOME | End: 2024-06-11
Admitting: INTERNAL MEDICINE
Payer: MEDICARE

## 2024-06-11 DIAGNOSIS — C50.919 ADENOCARCINOMA OF BREAST, UNSPECIFIED LATERALITY: ICD-10-CM

## 2024-06-11 DIAGNOSIS — R92.30 DENSE BREAST TISSUE: ICD-10-CM

## 2024-06-11 DIAGNOSIS — R92.8 ABNORMAL MAMMOGRAM: ICD-10-CM

## 2024-06-11 LAB
CREAT BLDA-MCNC: 0.7 MG/DL (ref 0.6–1.3)
EGFRCR SERPLBLD CKD-EPI 2021: 90.3 ML/MIN/1.73

## 2024-06-11 PROCEDURE — C8937 CAD BREAST MRI: HCPCS

## 2024-06-11 PROCEDURE — A9579 GAD-BASE MR CONTRAST NOS,1ML: HCPCS | Performed by: INTERNAL MEDICINE

## 2024-06-11 PROCEDURE — 82565 ASSAY OF CREATININE: CPT

## 2024-06-11 PROCEDURE — 25010000002 GADOTERIDOL PER 1 ML: Performed by: INTERNAL MEDICINE

## 2024-06-11 PROCEDURE — C8908 MRI W/O FOL W/CONT, BREAST,: HCPCS

## 2024-06-11 RX ADMIN — GADOTERIDOL 16 ML: 279.3 INJECTION, SOLUTION INTRAVENOUS at 10:29

## 2024-06-25 ENCOUNTER — CLINICAL SUPPORT (OUTPATIENT)
Dept: GENETICS | Facility: HOSPITAL | Age: 74
End: 2024-06-25
Payer: MEDICARE

## 2024-06-25 DIAGNOSIS — Z80.41 FAMILY HISTORY OF OVARIAN CANCER: ICD-10-CM

## 2024-06-25 DIAGNOSIS — Z80.3 FAMILY HISTORY OF BREAST CANCER: ICD-10-CM

## 2024-06-25 DIAGNOSIS — C50.911 ADENOCARCINOMA OF RIGHT BREAST: ICD-10-CM

## 2024-06-25 DIAGNOSIS — Z13.79 GENETIC TESTING: Primary | ICD-10-CM

## 2024-06-25 NOTE — PROGRESS NOTES
Gosia Gross, a 74-year-old female, was seen for genetic counseling due to a personal history of breast cancer. Genetic counseling was performed via telephone. Ms. Gross confirmed her name, date of birth, and that she was in [place] at the time of the appointment. She was diagnosed with right-sided triple negative breast cancer at age 63. She had a lumpectomy and radiation. Ms. Gross is post-menopausal. She had a total hysterectomy at age 51 due to cysts. She had a colonoscopy in 2011 and reports a history of no polyps. She reportedly had negative testing done in 2013 for BRCA1/2 though we were unable to review the results to confirm this. Due to her personal interest, Ms. Gross was interested in pursuing more comprehensive genetic testing, therefore the CancerNext Expanded panel was ordered through [facility] which analyzes BRCA1/2 and 69 additional genes associated with an increased cancer risk. Results are expected in 2-3 weeks.    PERTINENT FAMILY HISTORY: (See attached pedigree)   Mat Aunt:  Ovarian cancer, 55  Mat Cousin:  Breast cancer, 40s  Mat Grandfather: Lung cancer, 79  Pat Uncle:  Lung cancer, 67  Pat Grandfather: Hodkin's Lymphoma, 61      Records regarding the family history were not available for review.     RISK ASSESSMENT:   Ms. Gross's personal history of cancer led to concern for a hereditary cancer syndrome. We discussed BRCA1/2 testing as well as the option of pursuing a panel that would test for other genes known to impact cancer risk in addition to BRCA1/2. She does meet NCCN guidelines criteria for hereditary breast and ovarian cancer genetic testing based on her diagnosis of triple negative breast cancer. These risk assessments are based on the family history information provided at the time of the appointment and could change in the future should new information be obtained.    GENETIC COUNSELING: We reviewed the family history information in detail. Cases of cancer follow three  general patterns: sporadic, familial, and hereditary. While most cancer is sporadic, some cases appear to occur in family clusters. These cases are said to be familial and account for 10-20% of cancer cases. Familial cases may be due to a combination of shared genes and environmental factors among family members. In even fewer families, the cancer is said to be inherited, and the genes responsible for the cancer are known.      Family histories typical of hereditary cancer syndromes usually include multiple first- and second-degree relatives diagnosed with cancer types that define a syndrome. These cases tend to be diagnosed at younger-than-expected ages and can be bilateral or multifocal. The cancer in these families follows an autosomal dominant inheritance pattern, which indicates the likely presence of a mutation in a cancer susceptibility gene. Children and siblings of an individual believed to carry this mutation have a 50% chance of inheriting that mutation, thereby inheriting the increased risk to develop cancer. These mutations can be passed down from the maternal or the paternal lineage.    Hereditary breast cancer accounts for 5-10% of all cases of breast cancer. A significant proportion of hereditary breast and ovarian cancer can be attributed to mutations in the BRCA1 and BRCA2 genes.  Mutations in these genes confer an increased risk for breast cancer, ovarian cancer, male breast cancer, prostate cancer, and pancreatic cancer. While BRCA1/2 genes were tested in 2003, and were reportedly negative, we discussed the option of a more comprehensive panel that would also include RNA analysis. In order to get as much information as possible regarding her personal risks and potential risks for her family, Ms. Gross opted for testing through a comprehensive panel that would look at several other genes known to increase the risk for cancer.    GENETIC TESTING:  The risks, benefits and limitations of genetic  testing and implications for clinical management following testing were reviewed. DNA test results can influence decisions regarding screening, prevention and surgical management. Genetic testing can have significant psychological implications for both individuals and families. Also discussed was the possibility of employment and insurance discrimination based on genetic test results and the laws in place to prevent this ([place]).    We discussed panel testing, which would involve testing for BRCA1/2 as well as 69 additional genes that are associated with increased cancer risk. The benefits and limitations of genetic testing were discussed, and Ms. Gross decided to pursue testing via the panel. The implications of a positive or negative test result were discussed. We discussed the possibility that, in some cases, genetic test results may be informative or may be ambiguous due to the identification of a genetic variant. These variants may or may not be associated with an increased cancer risk. With multigene panel testing, it is not uncommon for a variant of uncertain significance (VUS) to be identified. If a VUS is identified, testing family members is typically not recommended and screening recommendations are made based on the family history. The laboratories that perform genetic testing work to reclassify the VUS and send out an amended report if and when a VUS is reclassified. The majority of variant findings are ultimately reclassified to a negative result. Given her personal and family history, a negative test result would not eliminate all cancer risk to her relatives, although the risk would not be as high as it would with positive genetic testing.      PLAN: Genetic testing via the CancerNext Expanded panel through [facility] was ordered. She plans to have her blood drawn on 7/1 at [facility]. Results are expected in 2-3 weeks once the sample has been received. If she has any questions or concerns in the  meantime, she is welcome to give our genetics team a call at [phone].      Lucrecia Wagner MS, [facility], [facility]  Licensed Certified Genetic Counselor

## 2024-07-01 ENCOUNTER — LAB (OUTPATIENT)
Dept: LAB | Facility: HOSPITAL | Age: 74
End: 2024-07-01
Payer: MEDICARE

## 2024-07-08 ENCOUNTER — DOCUMENTATION (OUTPATIENT)
Dept: GENETICS | Facility: HOSPITAL | Age: 74
End: 2024-07-08
Payer: MEDICARE

## 2024-07-08 ENCOUNTER — TELEPHONE (OUTPATIENT)
Dept: GENETICS | Facility: HOSPITAL | Age: 74
End: 2024-07-08
Payer: MEDICARE

## 2024-07-08 NOTE — PROGRESS NOTES
Gosia Gross, a 74-year-old female, was seen for genetic counseling due to a personal history of breast cancer. Genetic counseling was performed via telephone. Ms. Gross confirmed her name, date of birth, and that she was in [place] at the time of the appointment. She was diagnosed with right-sided triple negative breast cancer at age 63. She had a lumpectomy and radiation. Ms. Gross is post-menopausal. She had a total hysterectomy at age 51 due to cysts. She had a colonoscopy in 2011 and reports a history of no polyps. She reportedly had negative testing done in 2013 for BRCA1/2 though we were unable to review the results to confirm this. Due to her personal interest, Ms. Gross was interested in pursuing more comprehensive genetic testing, therefore the CancerNext Expanded panel was ordered through [facility] which analyzes BRCA1/2 and 69 additional genes associated with an increased cancer risk. Genetic testing was negative for deleterious mutations in the BRCA1/2 genes and 69 additional genes on this panel (see attached results). These normal results were discussed with Ms. Gross by telephone on 7/8/24.    PERTINENT FAMILY HISTORY: (See attached pedigree)   Mat Aunt:  Ovarian cancer, 55  Mat Cousin:  Breast cancer, 40s  Mat Grandfather: Lung cancer, 79  Pat Uncle:  Lung cancer, 67  Pat Grandfather: Hodkin's Lymphoma, 61      Records regarding the family history were not available for review.     RISK ASSESSMENT:   Ms. Gross's personal history of cancer led to concern for a hereditary cancer syndrome. We discussed BRCA1/2 testing as well as the option of pursuing a panel that would test for other genes known to impact cancer risk in addition to BRCA1/2. She does meet NCCN guidelines criteria for hereditary breast and ovarian cancer genetic testing based on her diagnosis of triple negative breast cancer. These risk assessments are based on the family history information provided at the time of the  appointment and could change in the future should new information be obtained.    GENETIC COUNSELING: We reviewed the family history information in detail. Cases of cancer follow three general patterns: sporadic, familial, and hereditary. While most cancer is sporadic, some cases appear to occur in family clusters. These cases are said to be familial and account for 10-20% of cancer cases. Familial cases may be due to a combination of shared genes and environmental factors among family members. In even fewer families, the cancer is said to be inherited, and the genes responsible for the cancer are known.      Family histories typical of hereditary cancer syndromes usually include multiple first- and second-degree relatives diagnosed with cancer types that define a syndrome. These cases tend to be diagnosed at younger-than-expected ages and can be bilateral or multifocal. The cancer in these families follows an autosomal dominant inheritance pattern, which indicates the likely presence of a mutation in a cancer susceptibility gene. Children and siblings of an individual believed to carry this mutation have a 50% chance of inheriting that mutation, thereby inheriting the increased risk to develop cancer. These mutations can be passed down from the maternal or the paternal lineage.    Hereditary breast cancer accounts for 5-10% of all cases of breast cancer. A significant proportion of hereditary breast and ovarian cancer can be attributed to mutations in the BRCA1 and BRCA2 genes.  Mutations in these genes confer an increased risk for breast cancer, ovarian cancer, male breast cancer, prostate cancer, and pancreatic cancer. While BRCA1/2 genes were tested in 2003, and were reportedly negative, we discussed the option of a more comprehensive panel that would also include RNA analysis. In order to get as much information as possible regarding her personal risks and potential risks for her family, Ms. Gross opted for  testing through a comprehensive panel that would look at several other genes known to increase the risk for cancer.    GENETIC TESTING:  The risks, benefits and limitations of genetic testing and implications for clinical management following testing were reviewed. DNA test results can influence decisions regarding screening, prevention and surgical management. Genetic testing can have significant psychological implications for both individuals and families. Also discussed was the possibility of employment and insurance discrimination based on genetic test results and the laws in place to prevent this ([place]).    We discussed panel testing, which would involve testing for BRCA1/2 as well as 69 additional genes that are associated with increased cancer risk. The benefits and limitations of genetic testing were discussed, and Ms. Gross decided to pursue testing via the panel. The implications of a positive or negative test result were discussed. We discussed the possibility that, in some cases, genetic test results may be informative or may be ambiguous due to the identification of a genetic variant. These variants may or may not be associated with an increased cancer risk. With multigene panel testing, it is not uncommon for a variant of uncertain significance (VUS) to be identified. If a VUS is identified, testing family members is typically not recommended and screening recommendations are made based on the family history. The laboratories that perform genetic testing work to reclassify the VUS and send out an amended report if and when a VUS is reclassified. The majority of variant findings are ultimately reclassified to a negative result. Given her personal and family history, a negative test result would not eliminate all cancer risk to her relatives, although the risk would not be as high as it would with positive genetic testing.      TEST RESULTS:  Genetic testing was negative by sequencing and  deletion/duplication testing, and RNA analysis for mutations in BRCA1/2 and the additional 69 genes on the CancerNext Expanded panel. The negative result greatly lowers the risk of a hereditary cancer syndrome for Ms. Gross. Her unaffected female relatives may still be considered to have a somewhat increased risk for breast cancer based on family history. This assessment is based on the information provided at the time of the consultation.    CANCER PREVENTION:  Despite the negative genetic test results, Ms. Gross's female relatives may have a somewhat increased lifetime risk for breast cancer based on family history. Female relatives could have a risk assessment performed using a family history-based model, such as the Tyrer-Cuzick model, to determine their individual risks. Given the increased risk, options available to individuals with an elevated lifetime risk for breast cancer were briefly discussed. Surveillance for relatives found to have an elevated lifetime risk of breast cancer (>20%, versus the average risk of 12%), based on NCCN guidelines, would consist of semi-annual clinical breast exams and monthly self-breast exams starting by age 18 and annual mammography starting 10 years younger than the earliest diagnosis in a close relative, or by age 40. According to an American Cancer Society expert panel and NCCN guidelines, annual breast MRI should be offered to women whose lifetime risk of breast cancer is 20-25 percent or more, also starting 10 years prior to the earliest diagnosis in the family, or by age 40.      PLAN: Genetic counseling remains available to Ms. Gross and her family. If she has any questions in the future, she can contact us at [phone].      Lucrecia Wagner MS, [facility], [facility]  Licensed Certified Genetic Counselor       Cc: Gosia Lopez MD

## 2024-07-08 NOTE — TELEPHONE ENCOUNTER
Spoke with patient and disclosed negative genetic results. Informed patient these results would be on [facility] and sent to her Dr. Patient requested a copy be mailed to her.

## 2024-08-08 ENCOUNTER — TELEPHONE (OUTPATIENT)
Dept: FAMILY MEDICINE CLINIC | Facility: CLINIC | Age: 74
End: 2024-08-08
Payer: MEDICARE

## 2024-08-08 DIAGNOSIS — E03.9 ACQUIRED HYPOTHYROIDISM: ICD-10-CM

## 2024-08-08 DIAGNOSIS — E78.2 MIXED HYPERLIPIDEMIA: Primary | ICD-10-CM

## 2024-08-08 NOTE — TELEPHONE ENCOUNTER
I left pt detailed vm with office hours and that labs have been ordered. No appt needed and to fast.

## 2024-08-08 NOTE — TELEPHONE ENCOUNTER
Caller: Gosia Gross    Relationship: Self    Best call back number: [phone]    What orders are you requesting (i.e. lab or imaging): LABS FOR WELLNESS    In what timeframe would the patient need to come in: WOULD LIKE THE ORDERS PUT IN NOW, SHE WILL COME NEXT WEEK TO GET THEM DONE    Where will you receive your lab/imaging services:     Additional notes:

## 2024-08-12 ENCOUNTER — LAB (OUTPATIENT)
Dept: FAMILY MEDICINE CLINIC | Facility: CLINIC | Age: 74
End: 2024-08-12
Payer: MEDICARE

## 2024-08-12 DIAGNOSIS — E78.2 MIXED HYPERLIPIDEMIA: ICD-10-CM

## 2024-08-12 DIAGNOSIS — E03.9 ACQUIRED HYPOTHYROIDISM: ICD-10-CM

## 2024-08-12 LAB
ALBUMIN SERPL-MCNC: 4.3 G/DL (ref 3.5–5.2)
ALBUMIN/GLOB SERPL: 2 G/DL
ALP SERPL-CCNC: 99 U/L (ref 39–117)
ALT SERPL W P-5'-P-CCNC: 17 U/L (ref 1–33)
ANION GAP SERPL CALCULATED.3IONS-SCNC: 8.3 MMOL/L (ref 5–15)
AST SERPL-CCNC: 16 U/L (ref 1–32)
BILIRUB SERPL-MCNC: 0.3 MG/DL (ref 0–1.2)
BUN SERPL-MCNC: 24 MG/DL (ref 8–23)
BUN/CREAT SERPL: 32.9 (ref 7–25)
CALCIUM SPEC-SCNC: 9.4 MG/DL (ref 8.6–10.5)
CHLORIDE SERPL-SCNC: 105 MMOL/L (ref 98–107)
CHOLEST SERPL-MCNC: 200 MG/DL (ref 0–200)
CO2 SERPL-SCNC: 25.7 MMOL/L (ref 22–29)
CREAT SERPL-MCNC: 0.73 MG/DL (ref 0.57–1)
EGFRCR SERPLBLD CKD-EPI 2021: 85.9 ML/MIN/1.73
GLOBULIN UR ELPH-MCNC: 2.2 GM/DL
GLUCOSE SERPL-MCNC: 99 MG/DL (ref 65–99)
HDLC SERPL-MCNC: 87 MG/DL (ref 40–60)
LDLC SERPL CALC-MCNC: 104 MG/DL (ref 0–100)
LDLC/HDLC SERPL: 1.19 {RATIO}
POTASSIUM SERPL-SCNC: 4.6 MMOL/L (ref 3.5–5.2)
PROT SERPL-MCNC: 6.5 G/DL (ref 6–8.5)
SODIUM SERPL-SCNC: 139 MMOL/L (ref 136–145)
TRIGL SERPL-MCNC: 46 MG/DL (ref 0–150)
TSH SERPL DL<=0.05 MIU/L-ACNC: 3.16 UIU/ML (ref 0.27–4.2)
VLDLC SERPL-MCNC: 9 MG/DL (ref 5–40)

## 2024-08-12 PROCEDURE — 84443 ASSAY THYROID STIM HORMONE: CPT | Performed by: FAMILY MEDICINE

## 2024-08-12 PROCEDURE — 36415 COLL VENOUS BLD VENIPUNCTURE: CPT

## 2024-08-12 PROCEDURE — 80061 LIPID PANEL: CPT | Performed by: FAMILY MEDICINE

## 2024-08-12 PROCEDURE — 80053 COMPREHEN METABOLIC PANEL: CPT | Performed by: FAMILY MEDICINE

## 2024-08-20 ENCOUNTER — LAB (OUTPATIENT)
Dept: FAMILY MEDICINE CLINIC | Facility: CLINIC | Age: 74
End: 2024-08-20
Payer: MEDICARE

## 2024-08-20 ENCOUNTER — OFFICE VISIT (OUTPATIENT)
Dept: FAMILY MEDICINE CLINIC | Facility: CLINIC | Age: 74
End: 2024-08-20
Payer: MEDICARE

## 2024-08-20 VITALS
HEART RATE: 67 BPM | OXYGEN SATURATION: 97 % | DIASTOLIC BLOOD PRESSURE: 85 MMHG | BODY MASS INDEX: 32.43 KG/M2 | HEIGHT: 63 IN | SYSTOLIC BLOOD PRESSURE: 120 MMHG | WEIGHT: 183 LBS

## 2024-08-20 DIAGNOSIS — E66.9 OBESITY (BMI 30.0-34.9): ICD-10-CM

## 2024-08-20 DIAGNOSIS — E03.9 ACQUIRED HYPOTHYROIDISM: ICD-10-CM

## 2024-08-20 DIAGNOSIS — Z12.11 SCREENING FOR COLON CANCER: ICD-10-CM

## 2024-08-20 DIAGNOSIS — M25.50 POLYARTHRALGIA: ICD-10-CM

## 2024-08-20 DIAGNOSIS — E78.2 MIXED HYPERLIPIDEMIA: ICD-10-CM

## 2024-08-20 DIAGNOSIS — Z71.3 ENCOUNTER FOR WEIGHT LOSS COUNSELING: ICD-10-CM

## 2024-08-20 DIAGNOSIS — Z83.719 FAMILY HISTORY OF COLONIC POLYPS: ICD-10-CM

## 2024-08-20 DIAGNOSIS — Z29.11 NEED FOR RSV VACCINATION: ICD-10-CM

## 2024-08-20 DIAGNOSIS — Z00.00 ENCOUNTER FOR ANNUAL WELLNESS EXAM IN MEDICARE PATIENT: Primary | ICD-10-CM

## 2024-08-20 LAB
CHROMATIN AB SERPL-ACNC: <10 IU/ML (ref 0–14)
ERYTHROCYTE [SEDIMENTATION RATE] IN BLOOD: 2 MM/HR (ref 0–30)

## 2024-08-20 PROCEDURE — 86235 NUCLEAR ANTIGEN ANTIBODY: CPT | Performed by: FAMILY MEDICINE

## 2024-08-20 PROCEDURE — 1125F AMNT PAIN NOTED PAIN PRSNT: CPT | Performed by: FAMILY MEDICINE

## 2024-08-20 PROCEDURE — 36415 COLL VENOUS BLD VENIPUNCTURE: CPT

## 2024-08-20 PROCEDURE — G0439 PPPS, SUBSEQ VISIT: HCPCS | Performed by: FAMILY MEDICINE

## 2024-08-20 PROCEDURE — 86431 RHEUMATOID FACTOR QUANT: CPT | Performed by: FAMILY MEDICINE

## 2024-08-20 PROCEDURE — 85652 RBC SED RATE AUTOMATED: CPT | Performed by: FAMILY MEDICINE

## 2024-08-20 PROCEDURE — 1159F MED LIST DOCD IN RCRD: CPT | Performed by: FAMILY MEDICINE

## 2024-08-20 PROCEDURE — 86225 DNA ANTIBODY NATIVE: CPT | Performed by: FAMILY MEDICINE

## 2024-08-20 PROCEDURE — 1170F FXNL STATUS ASSESSED: CPT | Performed by: FAMILY MEDICINE

## 2024-08-20 PROCEDURE — 99214 OFFICE O/P EST MOD 30 MIN: CPT | Performed by: FAMILY MEDICINE

## 2024-08-20 PROCEDURE — 1160F RVW MEDS BY RX/DR IN RCRD: CPT | Performed by: FAMILY MEDICINE

## 2024-08-20 RX ORDER — FLUTICASONE PROPIONATE 50 MCG
1 SPRAY, SUSPENSION (ML) NASAL DAILY
COMMUNITY

## 2024-08-20 RX ORDER — MULTIVIT-MIN/IRON/FOLIC ACID/K 18-600-40
CAPSULE ORAL
COMMUNITY

## 2024-08-20 RX ORDER — CELECOXIB 200 MG/1
200 CAPSULE ORAL DAILY
Qty: 30 CAPSULE | Refills: 1 | Status: SHIPPED | OUTPATIENT
Start: 2024-08-20

## 2024-08-20 RX ORDER — BIOTIN 1 MG
1000 TABLET ORAL DAILY
COMMUNITY

## 2024-08-20 RX ORDER — PHENTERMINE HYDROCHLORIDE 30 MG/1
30 CAPSULE ORAL EVERY MORNING
Qty: 30 CAPSULE | Refills: 0 | Status: SHIPPED | OUTPATIENT
Start: 2024-08-20 | End: 2024-08-21 | Stop reason: SDUPTHER

## 2024-08-20 NOTE — ASSESSMENT & PLAN NOTE
Patient's (Body mass index is 32.42 kg/m².) indicates that they are obese (BMI >30) with health conditions that include dyslipidemias . Weight is worsening. BMI  is above average; BMI management plan is completed. We discussed portion control, increasing exercise, and pharmacologic options including phentermine.

## 2024-08-20 NOTE — ASSESSMENT & PLAN NOTE
She was counseled on the need for a flu shot this fall.  She is interested in getting an RSV vaccine and asked me to send the order to her pharmacy.  She was counseled on the need for weight loss.

## 2024-08-20 NOTE — PROGRESS NOTES
Subjective   The ABCs of the Annual Wellness Visit  Medicare Wellness Visit      Gosia Gross is a 74 y.o. patient who presents for a Medicare Wellness Visit.    The following portions of the patient's history were reviewed and   updated as appropriate: allergies, current medications, past family history, past medical history, past social history, past surgical history, and problem list.    Compared to one year ago, the patient's physical   health is the same.  Compared to one year ago, the patient's mental   health is the same.    Recent Hospitalizations:  She was not admitted to the hospital during the last year.     Current Medical Providers:  Patient Care Team:  Genny Hammond MD as PCP - General (Family Medicine)  Genny Hammond MD as PCP - Family Medicine  Rica Lopez MD as Consulting Physician (Hematology and Oncology)  Amber Duvall MD as Consulting Physician (Dermatology)    Outpatient Medications Prior to Visit   Medication Sig Dispense Refill   • Ascorbic Acid (Vitamin C) 500 MG capsule Take  by mouth.     • aspirin 81 MG tablet ASPIRIN 81 MG ORAL TABLET     • Bioflavonoid Products (VITAMIN C PLUS) 500 MG tablet VITAMIN C PLUS 500 MG TABS     • Biotin 1000 MCG tablet Take 1,000 mcg by mouth Daily.     • Calcium Carbonate-Vitamin D 600-200 MG-UNIT tablet Take 2 tablets by mouth Daily.     • cyanocobalamin (VITAMIN B-12) 500 MCG tablet Take 625 mcg by mouth Daily.     • Flaxseed, Linseed, (Flax Seed Oil) 1000 MG capsule Take  by mouth.     • fluticasone (FLONASE) 50 MCG/ACT nasal spray 1 spray into the nostril(s) as directed by provider Daily.     • Glucosamine-Chondroit-Vit C-Mn (GLUCOSAMINE CHONDR 1500 COMPLX) capsule Take 2 capsules by mouth Daily.     • levothyroxine (SYNTHROID, LEVOTHROID) 75 MCG tablet Take 1 tablet by mouth Every Morning. 90 tablet 0   • magnesium oxide 250 MG tablet MAGNESIUM OXIDE 250 MG TABS     • Multiple Vitamins-Minerals (MULTIVITAMIN  ADULT) tablet Take  by mouth.     • multivitamin with minerals (EYE VITAMINS & MINERALS PO) Take 2 tablets by mouth Daily.     • TURMERIC PO Take 500 mg by mouth Daily.     • meloxicam (MOBIC) 15 MG tablet Take 1 tablet by mouth Daily. 90 tablet 0     No facility-administered medications prior to visit.     No opioid medication identified on active medication list. I have reviewed chart for other potential  high risk medication/s and harmful drug interactions in the elderly.      Aspirin is on active medication list. Aspirin use is indicated based on review of current medical condition/s. Pros and cons of this therapy have been discussed today. Benefits of this medication outweigh potential harm.  Patient has been encouraged to continue taking this medication.  .      Patient Active Problem List   Diagnosis   • Abnormal mammogram   • Breast lump   • Mixed hyperlipidemia   • Acquired hypothyroidism   • Adenocarcinoma, breast   • Arthritis   • Anemia   • Arthralgia   • Family history of colonic polyps   • Fatigue   • Hematuria   • Mixed stress and urge urinary incontinence   • Neuropathic pain   • Other sleep apnea   • Personal history of malignant neoplasm of breast   • Preoperative examination   • Restless leg syndrome   • Shingles   • Skin rash   • Snoring   • Toe pain   • Osteoarthritis   • Pneumonia   • Combined forms of age-related cataract of both eyes   • Encounter for annual wellness exam in Medicare patient   • Obesity (BMI 30.0-34.9)   • Anterior basement membrane dystrophy of both eyes   • History of retinal tear   • Macular hole of left eye   • Nuclear sclerosis   • Ocular hypertension   • Posterior vitreous detachment   • Pseudophakia of left eye   • Retinal tear of left eye     Advance Care Planning Advance Directive is on file.  ACP discussion was held with the patient during this visit. Patient has an advance directive in EMR which is still valid.             Objective   Vitals:    08/20/24 0849  "24   BP: 165/91 120/85   BP Location: Left arm    Patient Position: Sitting    Cuff Size: Large Adult    Pulse: 67    SpO2: 97%    Weight: 83 kg (183 lb)    Height: 160 cm (63\")    PainSc:   8    PainLoc: Generalized        Estimated body mass index is 32.42 kg/m² as calculated from the following:    Height as of this encounter: 160 cm (63\").    Weight as of this encounter: 83 kg (183 lb).    BMI is >= 30 and <35. (Class 1 Obesity). The following options were offered after discussion;: nutrition counseling/recommendations       Does the patient have evidence of cognitive impairment? No  MMSE done   Lab Results   Component Value Date    TRIG 46 2024    HDL 87 (H) 2024     (H) 2024    VLDL 9 2024                                                                                                Health  Risk Assessment    Smoking Status:  Social History     Tobacco Use   Smoking Status Never   • Passive exposure: Never   Smokeless Tobacco Never     Alcohol Consumption:  Social History     Substance and Sexual Activity   Alcohol Use Yes   • Alcohol/week: 1.0 standard drink of alcohol   • Types: 1 Glasses of wine per week    Comment: social- rarely, wine       Fall Risk Screen  STEADI Fall Risk Assessment was completed, and patient is at LOW risk for falls.Assessment completed on:2024    Depression Screenin/20/2024     8:56 AM   PHQ-2/PHQ-9 Depression Screening   Little Interest or Pleasure in Doing Things 0-->not at all   Feeling Down, Depressed or Hopeless 0-->not at all   PHQ-9: Brief Depression Severity Measure Score 0     Health Habits and Functional and Cognitive Screenin/20/2024     8:56 AM   Functional & Cognitive Status   Do you have difficulty preparing food and eating? No   Do you have difficulty bathing yourself, getting dressed or grooming yourself? No   Do you have difficulty using the toilet? No   Do you have difficulty moving around from " place to place? No   Do you have trouble with steps or getting out of a bed or a chair? No   Current Diet Well Balanced Diet   Dental Exam Up to date   Eye Exam Up to date   Exercise (times per week) 6 times per week   Current Exercises Include Stationary Bicycling/Spin Class;Light Weights   Do you need help using the phone?  No   Are you deaf or do you have serious difficulty hearing?  No   Do you need help to go to places out of walking distance? No   Do you need help shopping? No   Do you need help preparing meals?  No   Do you need help with housework?  No   Do you need help with laundry? No   Do you need help taking your medications? No   Do you need help managing money? No   Do you ever drive or ride in a car without wearing a seat belt? No   Have you felt unusual stress, anger or loneliness in the last month? No   Who do you live with? Spouse   If you need help, do you have trouble finding someone available to you? No   Have you been bothered in the last four weeks by sexual problems? No   Do you have difficulty concentrating, remembering or making decisions? No           Age-appropriate Screening Schedule:  Refer to the list below for future screening recommendations based on patient's age, sex and/or medical conditions. Orders for these recommended tests are listed in the plan section. The patient has been provided with a written plan.    Health Maintenance List  Health Maintenance   Topic Date Due   • RSV Vaccine - Adults (1 - 1-dose 60+ series) Never done   • COLORECTAL CANCER SCREENING  10/27/2016   • TDAP/TD VACCINES (2 - Td or Tdap) 10/03/2021   • COVID-19 Vaccine (7 - 2023-24 season) 02/11/2024   • BMI FOLLOWUP  08/10/2024   • INFLUENZA VACCINE  08/01/2024   • LIPID PANEL  08/12/2025   • ANNUAL WELLNESS VISIT  08/20/2025   • DXA SCAN  05/09/2026   • HEPATITIS C SCREENING  Completed   • Pneumococcal Vaccine 65+  Completed   • ZOSTER VACCINE  Completed                                                      "                                                                                           CMS Preventative Services Quick Reference  Risk Factors Identified During Encounter  Immunizations Discussed/Encouraged: Influenza    The above risks/problems have been discussed with the patient.  Pertinent information has been shared with the patient in the After Visit Summary.  An After Visit Summary and PPPS were made available to the patient.    Follow Up:   Next Medicare Wellness visit to be scheduled in 1 year.         Additional E&M Note during same encounter follows:  Patient has additional, significant, and separately identifiable condition(s)/problem(s) that require work above and beyond the Medicare Wellness Visit     Chief Complaint  Medicare Wellness-subsequent (Something for appetite. Bp normal at home. ) and Arthritis    Subjective   HPI  Gosia is also being seen today for additional medical problem/s: follow up on chol and thyroid  BP at home runs 110-130 sys  More pain from arthritis since the first of the years - all joints  Stopped waking hills and rides stationary bike 6 days a week instead  This has eased it very slightly  No major joint swelling  On mobic  Will occ take tylenol if she has a busy day planned  No FH of inflammatory arthritis that she is aware of  Snoring and cannot breathe through her nose  Uses flonase with no benefit  Feels like she is overeating at meals  Feels like she \"needs\" carbs/starches                  Objective   Vital Signs:  /85   Pulse 67   Ht 160 cm (63\")   Wt 83 kg (183 lb)   SpO2 97%   BMI 32.42 kg/m²   Physical Exam  Vitals and nursing note reviewed.   Constitutional:       Appearance: Normal appearance. She is well-developed and well-groomed. She is obese.   Cardiovascular:      Rate and Rhythm: Normal rate and regular rhythm.      Heart sounds: Normal heart sounds.   Pulmonary:      Effort: Pulmonary effort is normal.      Breath sounds: Normal breath sounds. "   Musculoskeletal:      Cervical back: Neck supple.      Right lower leg: No edema.      Left lower leg: No edema.      Comments: No visible joint swelling or erythema   Lymphadenopathy:      Cervical: No cervical adenopathy.   Neurological:      Mental Status: She is alert and oriented to person, place, and time.   Psychiatric:         Mood and Affect: Mood normal.         Behavior: Behavior is cooperative.       Lab Results   Component Value Date    GLUCOSE 99 08/12/2024    BUN 24 (H) 08/12/2024    CREATININE 0.73 08/12/2024     08/12/2024    K 4.6 08/12/2024     08/12/2024    CALCIUM 9.4 08/12/2024    PROTEINTOT 6.5 08/12/2024    ALBUMIN 4.3 08/12/2024    ALT 17 08/12/2024    AST 16 08/12/2024    ALKPHOS 99 08/12/2024    BILITOT 0.3 08/12/2024    GLOB 2.2 08/12/2024    AGRATIO 2.0 08/12/2024    BCR 32.9 (H) 08/12/2024    ANIONGAP 8.3 08/12/2024    EGFR 85.9 08/12/2024     Lab Results   Component Value Date    CHOL 200 08/12/2024    TRIG 46 08/12/2024    HDL 87 (H) 08/12/2024     (H) 08/12/2024     Lab Results   Component Value Date    TSH 3.160 08/12/2024             Assessment and Plan               Encounter for annual wellness exam in Medicare patient  She was counseled on the need for a flu shot this fall.  She is interested in getting an RSV vaccine and asked me to send the order to her pharmacy.  She was counseled on the need for weight loss.  Mixed hyperlipidemia  Recent CMP and lipid reviewed     Acquired hypothyroidism  Recent TSH reviewed and she will continue her current dose of levothyroxine    Need for RSV vaccination    Screening for colon cancer  She is overdue for her colonoscopy and this was ordered    Family history of colonic polyps  She is overdue for her colonoscopy and this was ordered  Polyarthralgia  Her symptoms have worsened so I have ordered a sed rate, comprehensive DENISSE, and rheumatoid factor.  I will try changing her from meloxicam to Celebrex and see if this helps.   She also knows that she can take 2 extra strength Tylenol 3 times a day if needed.  She was counseled on the need to avoid ibuprofen, Motrin, Aleve, Advil    Encounter for weight loss counseling  I will try her on 30 mg of phentermine and see her back in a month.  She knows she needs to make healthier food choices and try to increase her physical activity.  She is already very active with regards to exercise    Obesity (BMI 30.0-34.9)  Patient's (Body mass index is 32.42 kg/m².) indicates that they are obese (BMI >30) with health conditions that include dyslipidemias . Weight is worsening. BMI  is above average; BMI management plan is completed. We discussed portion control, increasing exercise, and pharmacologic options including phentermine .     Orders Placed This Encounter   Procedures   • Ambulatory Referral For Screening Colonoscopy     Referral Priority:   Routine     Referral Type:   Diagnostic Medical     Referral Reason:   Specialty Services Required     Requested Specialty:   Gastroenterology     Number of Visits Requested:   1     New Medications Ordered This Visit   Medications   • RSVPreF3 Vac Recomb Adjuvanted (AREXVY) 120 MCG/0.5ML reconstituted suspension injection     Sig: Inject 0.5 mL into the appropriate muscle as directed by prescriber 1 (One) Time for 1 dose.     Dispense:  0.5 mL     Refill:  0   • celecoxib (CeleBREX) 200 MG capsule     Sig: Take 1 capsule by mouth Daily. For arthritis     Dispense:  30 capsule     Refill:  1   I will see her back in a month       Follow Up   No follow-ups on file.  Patient was given instructions and counseling regarding her condition or for health maintenance advice. Please see specific information pulled into the AVS if appropriate.

## 2024-08-20 NOTE — PATIENT INSTRUCTIONS
Keep working to lose weight through healthy eating and exercise.   Get a flu shot   Consider getting an RSV shot  Increase water intake  Stop the meloxicam and try the celecoxib  May take 2 extra strength Tylenol 3 times a day as needed for pain

## 2024-08-21 ENCOUNTER — TELEPHONE (OUTPATIENT)
Dept: FAMILY MEDICINE CLINIC | Facility: CLINIC | Age: 74
End: 2024-08-21
Payer: MEDICARE

## 2024-08-21 DIAGNOSIS — Z71.3 ENCOUNTER FOR WEIGHT LOSS COUNSELING: ICD-10-CM

## 2024-08-21 DIAGNOSIS — E66.9 OBESITY (BMI 30.0-34.9): ICD-10-CM

## 2024-08-21 LAB
CENTROMERE B AB SER-ACNC: <0.2 AI (ref 0–0.9)
CHROMATIN AB SERPL-ACNC: <0.2 AI (ref 0–0.9)
DSDNA AB SER-ACNC: <1 IU/ML (ref 0–9)
ENA JO1 AB SER-ACNC: <0.2 AI (ref 0–0.9)
ENA RNP AB SER-ACNC: <0.2 AI (ref 0–0.9)
ENA SCL70 AB SER-ACNC: <0.2 AI (ref 0–0.9)
ENA SM AB SER-ACNC: <0.2 AI (ref 0–0.9)
ENA SS-A AB SER-ACNC: <0.2 AI (ref 0–0.9)
ENA SS-B AB SER-ACNC: <0.2 AI (ref 0–0.9)
Lab: NORMAL

## 2024-08-21 RX ORDER — PHENTERMINE HYDROCHLORIDE 30 MG/1
30 CAPSULE ORAL EVERY MORNING
Qty: 30 CAPSULE | Refills: 0 | Status: SHIPPED | OUTPATIENT
Start: 2024-08-21

## 2024-08-21 NOTE — TELEPHONE ENCOUNTER
Prior Auth completed for Phentermine HCl 30MG capsules via covermymeds. Pending determination within 72 hours minium.   (Key: [id number])  Rx #: [id number]    Form  [employer] Medicare Electronic PA Form (2017 NCPDP)  _______________________________________________________________________________See message from covermymeds response below:     Outcome  Additional Information Required  The medication you have requested is not covered by Medicare Part D Law. If you believe the medication is being used for a medically accepted or compendia supported indication approved by CMS, please contact your patient's plan.    Check on [facility]Rx for medication. Patient can you a GOODRx coupon and pay around $9.00 for a month at [facility].     I will call patient and let her know.

## 2024-08-21 NOTE — TELEPHONE ENCOUNTER
Spoke to patient about Phentermine medication being denied by her insurance. Explain that she can used a [facility] coupon to get her medication for about $9.00 at [facility] pharmacy. Patient verbalize understanding.     I have called [facility] and canceled the Rx for Phentermine. Can you send the Phentermine Rx to [facility] in [place] for the patient. She will used a [employer] coupon. I have faxed that coupon over to them for the patient.

## 2024-08-21 NOTE — TELEPHONE ENCOUNTER
Left message to return call to office. Following up on previous messages, test results, and /or questions.

## 2024-08-22 ENCOUNTER — OFFICE VISIT (OUTPATIENT)
Dept: ONCOLOGY | Facility: CLINIC | Age: 74
End: 2024-08-22
Payer: MEDICARE

## 2024-08-22 ENCOUNTER — LAB (OUTPATIENT)
Dept: LAB | Facility: HOSPITAL | Age: 74
End: 2024-08-22
Payer: MEDICARE

## 2024-08-22 VITALS
SYSTOLIC BLOOD PRESSURE: 165 MMHG | WEIGHT: 184 LBS | TEMPERATURE: 97 F | OXYGEN SATURATION: 97 % | HEIGHT: 64 IN | BODY MASS INDEX: 31.41 KG/M2 | HEART RATE: 66 BPM | RESPIRATION RATE: 18 BRPM | DIASTOLIC BLOOD PRESSURE: 82 MMHG

## 2024-08-22 DIAGNOSIS — C50.919 ADENOCARCINOMA OF BREAST, UNSPECIFIED LATERALITY: Primary | ICD-10-CM

## 2024-08-22 LAB
BASOPHILS # BLD AUTO: 0.01 10*3/MM3 (ref 0–0.2)
BASOPHILS NFR BLD AUTO: 0.1 % (ref 0–1.5)
DEPRECATED RDW RBC AUTO: 40.2 FL (ref 37–54)
EOSINOPHIL # BLD AUTO: 0.18 10*3/MM3 (ref 0–0.4)
EOSINOPHIL NFR BLD AUTO: 2.4 % (ref 0.3–6.2)
ERYTHROCYTE [DISTWIDTH] IN BLOOD BY AUTOMATED COUNT: 12.2 % (ref 12.3–15.4)
HCT VFR BLD AUTO: 40.5 % (ref 34–46.6)
HGB BLD-MCNC: 13.4 G/DL (ref 12–15.9)
HOLD SPECIMEN: NORMAL
HOLD SPECIMEN: NORMAL
LYMPHOCYTES # BLD AUTO: 1.99 10*3/MM3 (ref 0.7–3.1)
LYMPHOCYTES NFR BLD AUTO: 26.1 % (ref 19.6–45.3)
MCH RBC QN AUTO: 30.7 PG (ref 26.6–33)
MCHC RBC AUTO-ENTMCNC: 33.1 G/DL (ref 31.5–35.7)
MCV RBC AUTO: 92.9 FL (ref 79–97)
MONOCYTES # BLD AUTO: 0.75 10*3/MM3 (ref 0.1–0.9)
MONOCYTES NFR BLD AUTO: 9.8 % (ref 5–12)
NEUTROPHILS NFR BLD AUTO: 4.7 10*3/MM3 (ref 1.7–7)
NEUTROPHILS NFR BLD AUTO: 61.6 % (ref 42.7–76)
PLATELET # BLD AUTO: 215 10*3/MM3 (ref 140–450)
PMV BLD AUTO: 9.8 FL (ref 6–12)
RBC # BLD AUTO: 4.36 10*6/MM3 (ref 3.77–5.28)
WBC NRBC COR # BLD AUTO: 7.63 10*3/MM3 (ref 3.4–10.8)

## 2024-08-22 PROCEDURE — 85025 COMPLETE CBC W/AUTO DIFF WBC: CPT

## 2024-08-22 PROCEDURE — 36415 COLL VENOUS BLD VENIPUNCTURE: CPT

## 2024-08-22 NOTE — PROGRESS NOTES
Hematology/Oncology Outpatient Follow Up    PATIENT NAME:Gosia Gross  :1950  MRN: [id number]  PRIMARY CARE PHYSICIAN: Genny Hammond MD  REFERRING PHYSICIAN: No ref. provider found    Chief Complaint   Patient presents with    Follow-up     Adenocarcinoma of breast, unspecified laterality          HISTORY OF PRESENT ILLNESS:     This is a 74-year-old female has a history of stage Ia right lateral triple negative breast cancer diagnosed in 2013.  Patient underwent needle localized partial mastectomy and right sentinel lymph node biopsy on 2013.  The pathology revealed 6 mm focus of invasive mammary carcinoma with 2 axillary lymph nodes negative for metastatic disease.  Patient received adjuvant breast radiation.  She had BRCA 1 and 2 genetic test which was - 2014     She also has a history of iron deficiency anemia  She has not had any evidence of disease to date and she is followed on an annual basis.  Her last screening mammogram was May 9, 2024 and was normal follow-up in 1 year was recommended     Maternal aunt had ovarian cancer at age 50        Patient has one daughter     She does not smoke or drink alcohol     She is retired from customer service    2024: Patient had bilateral breast MRI which was essentially negative for any Malignancy  Past Medical History:   Diagnosis Date    Arthritis 2000    Breast cancer 2013    Right breast    Cancer     Rt breast cancer    Cataract 2021    surgery set up for & 8/10,  2021    Hx of radiation therapy 2013    Right breast cancer--6 tx's    Hyperlipidemia     Hypertension     Hypothyroidism 2010    Osteopenia 2005    osteopenia       Past Surgical History:   Procedure Laterality Date    BREAST BIOPSY Right 2013    prone stereotactic core bx     BREAST LUMPECTOMY Right 2013    Rt breast    COLONOSCOPY      yes not sure of date    HYSTERECTOMY      JOINT REPLACEMENT      bilateral knees in 2013    LYMPH  NODE BIOPSY  2013    TUBAL ABDOMINAL LIGATION  1990         Current Outpatient Medications:     Ascorbic Acid (Vitamin C) 500 MG capsule, Take  by mouth., Disp: , Rfl:     aspirin 81 MG tablet, ASPIRIN 81 MG ORAL TABLET, Disp: , Rfl:     Bioflavonoid Products (VITAMIN C PLUS) 500 MG tablet, VITAMIN C PLUS 500 MG TABS, Disp: , Rfl:     Biotin 1000 MCG tablet, Take 1,000 mcg by mouth Daily., Disp: , Rfl:     Calcium Carbonate-Vitamin D 600-200 MG-UNIT tablet, Take 2 tablets by mouth Daily., Disp: , Rfl:     celecoxib (CeleBREX) 200 MG capsule, Take 1 capsule by mouth Daily. For arthritis, Disp: 30 capsule, Rfl: 1    cyanocobalamin (VITAMIN B-12) 500 MCG tablet, Take 625 mcg by mouth Daily., Disp: , Rfl:     Flaxseed, Linseed, (Flax Seed Oil) 1000 MG capsule, Take  by mouth., Disp: , Rfl:     fluticasone (FLONASE) 50 MCG/ACT nasal spray, 1 spray into the nostril(s) as directed by provider Daily., Disp: , Rfl:     Glucosamine-Chondroit-Vit C-Mn (GLUCOSAMINE CHONDR 1500 COMPLX) capsule, Take 2 capsules by mouth Daily., Disp: , Rfl:     levothyroxine (SYNTHROID, LEVOTHROID) 75 MCG tablet, Take 1 tablet by mouth Every Morning., Disp: 90 tablet, Rfl: 0    magnesium oxide 250 MG tablet, MAGNESIUM OXIDE 250 MG TABS, Disp: , Rfl:     Multiple Vitamins-Minerals (MULTIVITAMIN ADULT) tablet, Take  by mouth., Disp: , Rfl:     multivitamin with minerals (EYE VITAMINS & MINERALS PO), Take 2 tablets by mouth Daily., Disp: , Rfl:     phentermine 30 MG capsule, Take 1 capsule by mouth Every Morning., Disp: 30 capsule, Rfl: 0    TURMERIC PO, Take 500 mg by mouth Daily., Disp: , Rfl:     Allergies   Allergen Reactions    Ropinirole Hcl Nausea And Vomiting       Family History   Problem Relation Age of Onset    Hyperlipidemia Mother     Arthritis Father     Hyperlipidemia Father     Thyroid disease Father     Vision loss Father     Lung cancer Maternal Grandfather 79    Hodgkin's lymphoma Paternal Grandfather 61    Ovarian cancer Maternal  Aunt 55    Breast cancer Maternal Cousin         Dx 40s    Lung cancer Paternal Uncle 67       Cancer-related family history includes Breast cancer in her maternal cousin; Lung cancer (age of onset: 67) in her paternal uncle; Lung cancer (age of onset: 79) in her maternal grandfather; Ovarian cancer (age of onset: 55) in her maternal aunt.    Social History     Tobacco Use    Smoking status: Never     Passive exposure: Never    Smokeless tobacco: Never   Vaping Use    Vaping status: Never Used   Substance Use Topics    Alcohol use: Yes     Alcohol/week: 1.0 standard drink of alcohol     Types: 1 Glasses of wine per week     Comment: social- rarely, wine    Drug use: No       I have reviewed and confirmed the accuracy of the patient's history: Chief complaint, HPI, ROS, and Subjective as entered by the MA/LPN/RN. Rica Lopez MD 08/22/24      SUBJECTIVE:     Patient is here today for follow-up and denies any new issues.  She continues to perform breast self exams and denies any lumps.  Patient would like to have breast MRI every other year.    REVIEW OF SYSTEMS:  Review of Systems   Constitutional:  Negative for chills, fatigue and fever.   HENT:  Negative for congestion, drooling, ear discharge, rhinorrhea, sinus pressure and tinnitus.    Eyes:  Negative for photophobia, pain and discharge.   Respiratory:  Negative for apnea, choking and stridor.    Cardiovascular:  Negative for palpitations.   Gastrointestinal:  Negative for abdominal distention, abdominal pain and anal bleeding.   Endocrine: Negative for polydipsia and polyphagia.   Genitourinary:  Negative for decreased urine volume, flank pain and genital sores.   Musculoskeletal:  Negative for gait problem, neck pain and neck stiffness.   Skin:  Negative for color change, rash and wound.   Neurological:  Negative for tremors, seizures, syncope, facial asymmetry and speech difficulty.   Hematological:  Negative for adenopathy.   Psychiatric/Behavioral:  " Negative for agitation, confusion, hallucinations and self-injury. The patient is not hyperactive.        OBJECTIVE:    Vitals:    08/22/24 1046   BP: 165/82   Pulse: 66   Resp: 18   Temp: 97 °F (36.1 °C)   TempSrc: Infrared   SpO2: 97%   Weight: 83.5 kg (184 lb)   Height: 162.6 cm (64\")   PainSc: 0-No pain     Body mass index is 31.58 kg/m².    ECOG  (1) Restricted in physically strenuous activity, ambulatory and able to do work of light nature    Physical Exam  Vitals and nursing note reviewed.   Constitutional:       General: She is not in acute distress.     Appearance: She is not diaphoretic.   HENT:      Head: Normocephalic and atraumatic.   Eyes:      General: No scleral icterus.        Right eye: No discharge.         Left eye: No discharge.      Conjunctiva/sclera: Conjunctivae normal.   Neck:      Thyroid: No thyromegaly.   Cardiovascular:      Rate and Rhythm: Normal rate and regular rhythm.      Heart sounds: Normal heart sounds.      No friction rub. No gallop.   Pulmonary:      Effort: Pulmonary effort is normal. No respiratory distress.      Breath sounds: No stridor. No wheezing.   Abdominal:      General: Bowel sounds are normal.      Palpations: Abdomen is soft. There is no mass.      Tenderness: There is no abdominal tenderness. There is no guarding or rebound.   Musculoskeletal:         General: No tenderness. Normal range of motion.      Cervical back: Normal range of motion and neck supple.   Lymphadenopathy:      Cervical: No cervical adenopathy.   Skin:     General: Skin is warm.      Findings: No erythema or rash.   Neurological:      Mental Status: She is alert and oriented to person, place, and time.      Motor: No abnormal muscle tone.   Psychiatric:         Behavior: Behavior normal.         RECENT LABS  WBC   Date Value Ref Range Status   08/22/2024 7.63 3.40 - 10.80 10*3/mm3 Final     RBC   Date Value Ref Range Status   08/22/2024 4.36 3.77 - 5.28 10*6/mm3 Final     Hemoglobin   Date " Value Ref Range Status   08/22/2024 13.4 12.0 - 15.9 g/dL Final     Hematocrit   Date Value Ref Range Status   08/22/2024 40.5 34.0 - 46.6 % Final     MCV   Date Value Ref Range Status   08/22/2024 92.9 79.0 - 97.0 fL Final     MCH   Date Value Ref Range Status   08/22/2024 30.7 26.6 - 33.0 pg Final     MCHC   Date Value Ref Range Status   08/22/2024 33.1 31.5 - 35.7 g/dL Final     RDW   Date Value Ref Range Status   08/22/2024 12.2 (L) 12.3 - 15.4 % Final     RDW-SD   Date Value Ref Range Status   08/22/2024 40.2 37.0 - 54.0 fl Final     MPV   Date Value Ref Range Status   08/22/2024 9.8 6.0 - 12.0 fL Final     Platelets   Date Value Ref Range Status   08/22/2024 215 140 - 450 10*3/mm3 Final     Neutrophil %   Date Value Ref Range Status   08/22/2024 61.6 42.7 - 76.0 % Final     Lymphocyte %   Date Value Ref Range Status   08/22/2024 26.1 19.6 - 45.3 % Final     Monocyte %   Date Value Ref Range Status   08/22/2024 9.8 5.0 - 12.0 % Final     Eosinophil %   Date Value Ref Range Status   08/22/2024 2.4 0.3 - 6.2 % Final     Basophil %   Date Value Ref Range Status   08/22/2024 0.1 0.0 - 1.5 % Final     Immature Grans %   Date Value Ref Range Status   08/01/2022 0.0 0.0 - 0.5 % Final     Neutrophils, Absolute   Date Value Ref Range Status   08/22/2024 4.70 1.70 - 7.00 10*3/mm3 Final     Lymphocytes, Absolute   Date Value Ref Range Status   08/22/2024 1.99 0.70 - 3.10 10*3/mm3 Final     Monocytes, Absolute   Date Value Ref Range Status   08/22/2024 0.75 0.10 - 0.90 10*3/mm3 Final     Eosinophils, Absolute   Date Value Ref Range Status   08/22/2024 0.18 0.00 - 0.40 10*3/mm3 Final     Basophils, Absolute   Date Value Ref Range Status   08/22/2024 0.01 0.00 - 0.20 10*3/mm3 Final     Immature Grans, Absolute   Date Value Ref Range Status   08/01/2022 0.00 0.00 - 0.05 10*3/mm3 Final     nRBC   Date Value Ref Range Status   08/01/2022 0.0 0.0 - 0.2 /100 WBC Final       Lab Results   Component Value Date    GLUCOSE 99  08/12/2024    BUN 24 (H) 08/12/2024    CREATININE 0.73 08/12/2024    EGFRIFNONA 82 07/19/2021    BCR 32.9 (H) 08/12/2024    K 4.6 08/12/2024    CO2 25.7 08/12/2024    CALCIUM 9.4 08/12/2024    ALBUMIN 4.3 08/12/2024    LABIL2 1.5 01/07/2019    AST 16 08/12/2024    ALT 17 08/12/2024         Assessment & Plan     Adenocarcinoma of breast, unspecified laterality  - CBC & Differential      ASSESSMENT:    Adenocarcinoma of breast, unspecified laterality  - CBC & Differential        History of stage Ia triple negative breast cancer T1b N0 M0 diagnosed in 2013 status post right lumpectomy sentinel lymph node biopsy.  This was followed by adjuvant right breast radiation.  Ongoing surveillance now on a yearly basis  History of iron deficiency anemia  Dense breast tissue: Patient is interested in utilizing breast MRI for her screening.  She wants to have breast MRI every other year and continue with her annual mammograms.  Family history of malignancies as well as personal history: Referred to genetic counselor.  She had comprehensive gene test which was negative for any deleterious mutation.  She had comprehensive genetic test which was negative for any deleterious mutation in all 71 genes analyzed.  7/8/2024                Plans     Bilateral breast MRI next due 6/11/2026  Continue annual mammogram surveillance images next due May 2025.  Patient will be seen also in May 2025  Reviewed her genetic results  Reviewed her labs results  She will be seen on a yearly basis  Schedule follow-up May 2025  All questions answered            I spent 30 total minutes, face-to-face, caring for Gosia today. 90% of this time involved counseling and/or coordination of care as documented within this note.

## 2024-08-22 NOTE — PROGRESS NOTES
Left message to return call to doctor's office at [facility]. Either to get test results or message from provider.

## 2024-09-19 ENCOUNTER — TELEPHONE (OUTPATIENT)
Dept: FAMILY MEDICINE CLINIC | Facility: CLINIC | Age: 74
End: 2024-09-19
Payer: MEDICARE

## 2024-09-19 DIAGNOSIS — Z71.3 ENCOUNTER FOR WEIGHT LOSS COUNSELING: ICD-10-CM

## 2024-09-19 DIAGNOSIS — E66.9 OBESITY (BMI 30.0-34.9): ICD-10-CM

## 2024-09-19 RX ORDER — PHENTERMINE HYDROCHLORIDE 30 MG/1
30 CAPSULE ORAL EVERY MORNING
Qty: 30 CAPSULE | Refills: 0 | OUTPATIENT
Start: 2024-09-19

## 2024-09-25 ENCOUNTER — CLINICAL SUPPORT (OUTPATIENT)
Dept: FAMILY MEDICINE CLINIC | Facility: CLINIC | Age: 74
End: 2024-09-25
Payer: MEDICARE

## 2024-09-25 VITALS
DIASTOLIC BLOOD PRESSURE: 77 MMHG | HEART RATE: 80 BPM | WEIGHT: 175 LBS | SYSTOLIC BLOOD PRESSURE: 128 MMHG | BODY MASS INDEX: 30.04 KG/M2

## 2024-09-25 DIAGNOSIS — Z71.3 ENCOUNTER FOR WEIGHT LOSS COUNSELING: ICD-10-CM

## 2024-09-25 DIAGNOSIS — E66.9 OBESITY (BMI 30.0-34.9): ICD-10-CM

## 2024-09-25 RX ORDER — PHENTERMINE HYDROCHLORIDE 30 MG/1
30 CAPSULE ORAL EVERY MORNING
Qty: 30 CAPSULE | Refills: 0 | Status: SHIPPED | OUTPATIENT
Start: 2024-09-25

## 2024-09-25 RX ORDER — PHENTERMINE HYDROCHLORIDE 30 MG/1
30 CAPSULE ORAL EVERY MORNING
Qty: 30 CAPSULE | Refills: 0 | Status: SHIPPED | OUTPATIENT
Start: 2024-09-25 | End: 2024-09-25 | Stop reason: SDUPTHER

## 2024-11-08 ENCOUNTER — OFFICE VISIT (OUTPATIENT)
Dept: FAMILY MEDICINE CLINIC | Facility: CLINIC | Age: 74
End: 2024-11-08
Payer: MEDICARE

## 2024-11-08 VITALS
HEIGHT: 64 IN | BODY MASS INDEX: 29.71 KG/M2 | OXYGEN SATURATION: 98 % | TEMPERATURE: 97.7 F | HEART RATE: 83 BPM | SYSTOLIC BLOOD PRESSURE: 149 MMHG | WEIGHT: 174 LBS | DIASTOLIC BLOOD PRESSURE: 93 MMHG

## 2024-11-08 DIAGNOSIS — L85.3 DRY SKIN: ICD-10-CM

## 2024-11-08 DIAGNOSIS — E66.3 OVERWEIGHT (BMI 25.0-29.9): ICD-10-CM

## 2024-11-08 DIAGNOSIS — Z71.3 ENCOUNTER FOR WEIGHT LOSS COUNSELING: Primary | ICD-10-CM

## 2024-11-08 DIAGNOSIS — M25.50 POLYARTHRALGIA: ICD-10-CM

## 2024-11-08 RX ORDER — MELOXICAM 15 MG/1
15 TABLET ORAL DAILY
COMMUNITY
Start: 2024-11-08

## 2024-11-08 RX ORDER — PHENTERMINE HYDROCHLORIDE 30 MG/1
30 CAPSULE ORAL EVERY MORNING
Qty: 30 CAPSULE | Refills: 0 | Status: SHIPPED | OUTPATIENT
Start: 2024-11-08

## 2024-11-08 RX ORDER — MOMETASONE FUROATE 1 MG/G
1 CREAM TOPICAL DAILY
Qty: 15 G | Refills: 1 | Status: SHIPPED | OUTPATIENT
Start: 2024-11-08

## 2024-11-08 NOTE — PROGRESS NOTES
Subjective   Gosia Gross is a 74 y.o. female.     History of Present Illness  The patient presents for follow-up on her weight and blood pressure.    She has been taking phentermine for a total of 2 months, which she reports has been effective in suppressing her appetite. She has not been on any blood pressure medication. She reports no chest pain or breathing difficulties.    She has been using Celebrex for the past 30 days, but it has not provided any relief. She previously used meloxicam, which she found more effective. She has noticed a difference in her knees after losing a few pounds.    Additionally, she reports having sores in her ears and dry skin and has been using the triamcinolone cream her  has.  She is asking for something similar       The following portions of the patient's history were reviewed and updated as appropriate: allergies, current medications, past family history, past medical history, past social history, past surgical history, and problem list.  Past Medical History:   Diagnosis Date    Arthritis 2000    Breast cancer 01/2013    Right breast    Cancer     Rt breast cancer    Cataract 05/2021    surgery set up for 8/4/& 8/10,  2021    Hx of radiation therapy 04/2013    Right breast cancer--6 tx's    Hyperlipidemia     Hypertension     Hypothyroidism 2010    Osteopenia 2005    osteopenia     Past Surgical History:   Procedure Laterality Date    BREAST BIOPSY Right 01/2013    prone stereotactic core bx     BREAST LUMPECTOMY Right 01/2013    Rt breast    COLONOSCOPY      yes not sure of date    HYSTERECTOMY      JOINT REPLACEMENT      bilateral knees in 2013    LYMPH NODE BIOPSY  2013    TUBAL ABDOMINAL LIGATION  1990     Family History   Problem Relation Age of Onset    Hyperlipidemia Mother     Arthritis Father     Hyperlipidemia Father     Thyroid disease Father     Vision loss Father     Lung cancer Maternal Grandfather 79    Hodgkin's lymphoma Paternal Grandfather 61    Ovarian  cancer Maternal Aunt 55    Breast cancer Maternal Cousin         Dx 40s    Lung cancer Paternal Uncle 67     Social History     Socioeconomic History    Marital status:    Tobacco Use    Smoking status: Never     Passive exposure: Never    Smokeless tobacco: Never   Vaping Use    Vaping status: Never Used   Substance and Sexual Activity    Alcohol use: Yes     Alcohol/week: 1.0 standard drink of alcohol     Types: 1 Glasses of wine per week     Comment: social- rarely, wine    Drug use: No    Sexual activity: Yes     Partners: Male         Current Outpatient Medications:     Ascorbic Acid (Vitamin C) 500 MG capsule, Take  by mouth., Disp: , Rfl:     aspirin 81 MG tablet, ASPIRIN 81 MG ORAL TABLET, Disp: , Rfl:     Bioflavonoid Products (VITAMIN C PLUS) 500 MG tablet, VITAMIN C PLUS 500 MG TABS, Disp: , Rfl:     Biotin 1000 MCG tablet, Take 1,000 mcg by mouth Daily., Disp: , Rfl:     Calcium Carbonate-Vitamin D 600-200 MG-UNIT tablet, Take 2 tablets by mouth Daily., Disp: , Rfl:     cyanocobalamin (VITAMIN B-12) 500 MCG tablet, Take 625 mcg by mouth Daily., Disp: , Rfl:     Flaxseed, Linseed, (Flax Seed Oil) 1000 MG capsule, Take  by mouth., Disp: , Rfl:     fluticasone (FLONASE) 50 MCG/ACT nasal spray, Administer 1 spray into the nostril(s) as directed by provider Daily., Disp: , Rfl:     Glucosamine-Chondroit-Vit C-Mn (GLUCOSAMINE CHONDR 1500 COMPLX) capsule, Take 2 capsules by mouth Daily., Disp: , Rfl:     levothyroxine (SYNTHROID, LEVOTHROID) 75 MCG tablet, Take 1 tablet by mouth Every Morning., Disp: 90 tablet, Rfl: 0    magnesium oxide 250 MG tablet, MAGNESIUM OXIDE 250 MG TABS, Disp: , Rfl:     Multiple Vitamins-Minerals (MULTIVITAMIN ADULT) tablet, Take  by mouth., Disp: , Rfl:     multivitamin with minerals (EYE VITAMINS & MINERALS PO), Take 2 tablets by mouth Daily., Disp: , Rfl:     phentermine 30 MG capsule, Take 1 capsule by mouth Every Morning., Disp: 30 capsule, Rfl: 0    TURMERIC PO, Take 500  "mg by mouth Daily., Disp: , Rfl:     meloxicam (Mobic) 15 MG tablet, Take 1 tablet by mouth Daily., Disp: , Rfl:     mometasone (ELOCON) 0.1 % cream, Apply 1 Application topically to the appropriate area as directed Daily., Disp: 15 g, Rfl: 1    Review of Systems  ROS done and noted in HPI    /93 (BP Location: Left arm, Patient Position: Sitting, Cuff Size: Adult)   Pulse 83   Temp 97.7 °F (36.5 °C) (Temporal)   Ht 162.6 cm (64\")   Wt 78.9 kg (174 lb)   LMP  (LMP Unknown)   SpO2 98%   BMI 29.87 kg/m²           Objective   Physical Exam  Vitals and nursing note reviewed.   Constitutional:       Appearance: Normal appearance. She is overweight.   Cardiovascular:      Rate and Rhythm: Normal rate and regular rhythm.      Heart sounds: Normal heart sounds.   Pulmonary:      Effort: Pulmonary effort is normal.      Breath sounds: Normal breath sounds.   Musculoskeletal:      Right lower leg: No edema.      Left lower leg: No edema.   Neurological:      Mental Status: She is alert.   Psychiatric:         Behavior: Behavior is cooperative.       Physical Exam  Vital Signs  Weight is 174.       Results         Assessment & Plan   Problems Addressed this Visit          Endocrine and Metabolic    Obesity (BMI 30.0-34.9)    Relevant Medications    phentermine 30 MG capsule    Encounter for weight loss counseling - Primary    Relevant Medications    phentermine 30 MG capsule       Musculoskeletal and Injuries    Polyarthralgia     Other Visit Diagnoses       Dry skin        Relevant Medications    mometasone (ELOCON) 0.1 % cream          Diagnoses         Codes Comments    Encounter for weight loss counseling    -  Primary ICD-10-CM: Z71.3  ICD-9-CM: V65.3     Obesity (BMI 30.0-34.9)     ICD-10-CM: E66.811  ICD-9-CM: 278.00     Polyarthralgia     ICD-10-CM: M25.50  ICD-9-CM: 719.49     Dry skin     ICD-10-CM: L85.3  ICD-9-CM: 701.1           Assessment & Plan  1. Weight management/overweight.  Her weight has " decreased from 183 lbs in August to 174 lbs currently. Phentermine has been effective in suppressing her appetite. A prescription for another month of phentermine will be sent to [facility] pharmacy. She was advised to make lifestyle changes, including healthier food choices and 30 minutes of exercise daily to maintain weight loss.  BP has done well while on the medication running 110-120 systolic    2. polyarthralgia.  She tried Celebrex for 30 days without improvement and felt that meloxicam worked better. She is currently on the maximum dose of meloxicam (15 mg). Over-the-counter rubs and Tylenol were recommended for additional pain management. Previous blood work ruled out autoimmune forms of arthritis, confirming osteoarthritis.    3. Dry skin in ears.  She has dry skin and sores in her ears. A prescription for elocon will be sent to  pharmacy.            Patient or patient representative verbalized consent for the use of Ambient Listening during the visit with  Genny Hammond MD for chart documentation. 11/8/2024  12:13 EST

## 2024-12-12 PROBLEM — E66.3 OVERWEIGHT (BMI 25.0-29.9): Status: ACTIVE | Noted: 2021-07-29

## 2025-01-17 RX ORDER — LEVOTHYROXINE SODIUM 75 UG/1
75 TABLET ORAL
Qty: 90 TABLET | Refills: 0 | Status: SHIPPED | OUTPATIENT
Start: 2025-01-17

## 2025-01-17 NOTE — TELEPHONE ENCOUNTER
Caller: Floyd Gross    Relationship: Emergency Contact    Best call back number: [phone]     Requested Prescriptions:   Requested Prescriptions     Pending Prescriptions Disp Refills    levothyroxine (SYNTHROID, LEVOTHROID) 75 MCG tablet 90 tablet 0     Sig: Take 1 tablet by mouth Every Morning.        Pharmacy where request should be sent: [facility] [id number]  [place], [place] [address] - [phone] [facility] [phone] FX     Last office visit with prescribing clinician: 11/8/2024   Last telemedicine visit with prescribing clinician: Visit date not found   Next office visit with prescribing clinician: 8/22/2025     Additional details provided by patient: PATIENT IS OUT OF MEDICATION    Does the patient have less than a 3 day supply:  [x] Yes  [] No    Would you like a call back once the refill request has been completed: [x] Yes [] No    If the office needs to give you a call back, can they leave a voicemail: [x] Yes [] No    Trixie Marie Rep   01/17/25 11:32 EST

## 2025-05-05 ENCOUNTER — TRANSCRIBE ORDERS (OUTPATIENT)
Dept: FAMILY MEDICINE CLINIC | Facility: CLINIC | Age: 75
End: 2025-05-05
Payer: MEDICARE

## 2025-05-05 DIAGNOSIS — Z12.31 BREAST CANCER SCREENING BY MAMMOGRAM: Primary | ICD-10-CM

## 2025-05-12 ENCOUNTER — HOSPITAL ENCOUNTER (OUTPATIENT)
Dept: MAMMOGRAPHY | Facility: HOSPITAL | Age: 75
Discharge: HOME | End: 2025-05-12
Admitting: FAMILY MEDICINE
Payer: MEDICARE

## 2025-05-12 DIAGNOSIS — Z12.31 BREAST CANCER SCREENING BY MAMMOGRAM: ICD-10-CM

## 2025-05-12 PROCEDURE — 77067 SCR MAMMO BI INCL CAD: CPT

## 2025-05-12 PROCEDURE — 77063 BREAST TOMOSYNTHESIS BI: CPT

## 2025-05-13 NOTE — PROGRESS NOTES
Hematology/Oncology Outpatient Follow Up    PATIENT NAME:Gosia Gross  :1950  MRN: [id number]  PRIMARY CARE PHYSICIAN: Genny Hammond MD  REFERRING PHYSICIAN: No ref. provider found    Chief Complaint   Patient presents with    Follow-up     Adenocarcinoma of breast, unspecified laterality        HISTORY OF PRESENT ILLNESS:     This is a 74-year-old female has a history of stage Ia right lateral triple negative breast cancer diagnosed in 2013.  Patient underwent needle localized partial mastectomy and right sentinel lymph node biopsy on 2013.  The pathology revealed 6 mm focus of invasive mammary carcinoma with 2 axillary lymph nodes negative for metastatic disease.  Patient received adjuvant breast radiation.  She had BRCA 1 and 2 genetic test which was - 2014     She also has a history of iron deficiency anemia  She has not had any evidence of disease to date and she is followed on an annual basis.  Her last screening mammogram was May 9, 2024 and was normal follow-up in 1 year was recommended     Maternal aunt had ovarian cancer at age 50        Patient has one daughter     She does not smoke or drink alcohol     She is retired from customer service    2024: Patient had bilateral breast MRI which was essentially negative for any Malignancy  2025: Bilateral screening mammogram with no evidence of malignancy    Past Medical History:   Diagnosis Date    Arthritis 2000    Breast cancer 2013    Right breast    Cancer     Rt breast cancer    Cataract 2021    surgery set up for & 8/10,  2021    Hx of radiation therapy 2013    Right breast cancer--6 tx's    Hyperlipidemia     Hypertension     Hypothyroidism 2010    Osteopenia 2005    osteopenia       Past Surgical History:   Procedure Laterality Date    BREAST BIOPSY Right 2013    prone stereotactic core bx     BREAST LUMPECTOMY Right 2013    Rt breast    COLONOSCOPY      yes not sure of date     HYSTERECTOMY      JOINT REPLACEMENT      bilateral knees in 2013    LYMPH NODE BIOPSY  2013    TUBAL ABDOMINAL LIGATION  1990         Current Outpatient Medications:     Ascorbic Acid (Vitamin C) 500 MG capsule, Take  by mouth., Disp: , Rfl:     aspirin 81 MG tablet, ASPIRIN 81 MG ORAL TABLET, Disp: , Rfl:     Biotin 1000 MCG tablet, Take 1,000 mcg by mouth Daily., Disp: , Rfl:     Calcium Carbonate-Vitamin D 600-200 MG-UNIT tablet, Take 2 tablets by mouth Daily., Disp: , Rfl:     cyanocobalamin (VITAMIN B-12) 500 MCG tablet, Take 625 mcg by mouth Daily., Disp: , Rfl:     Flaxseed, Linseed, (Flax Seed Oil) 1000 MG capsule, Take  by mouth., Disp: , Rfl:     fluticasone (FLONASE) 50 MCG/ACT nasal spray, Administer 1 spray into the nostril(s) as directed by provider Daily., Disp: , Rfl:     Glucosamine-Chondroit-Vit C-Mn (GLUCOSAMINE CHONDR 1500 COMPLX) capsule, Take 2 capsules by mouth Daily., Disp: , Rfl:     levothyroxine (SYNTHROID, LEVOTHROID) 75 MCG tablet, Take 1 tablet by mouth Every Morning., Disp: 90 tablet, Rfl: 0    magnesium oxide 250 MG tablet, MAGNESIUM OXIDE 250 MG TABS, Disp: , Rfl:     meloxicam (Mobic) 15 MG tablet, Take 1 tablet by mouth Daily., Disp: , Rfl:     mometasone (ELOCON) 0.1 % cream, Apply 1 Application topically to the appropriate area as directed Daily., Disp: 15 g, Rfl: 1    Multiple Vitamins-Minerals (MULTIVITAMIN ADULT) tablet, Take  by mouth., Disp: , Rfl:     multivitamin with minerals (EYE VITAMINS & MINERALS PO), Take 2 tablets by mouth Daily., Disp: , Rfl:     TURMERIC PO, Take 500 mg by mouth Daily., Disp: , Rfl:     Bioflavonoid Products (VITAMIN C PLUS) 500 MG tablet, VITAMIN C PLUS 500 MG TABS (Patient not taking: Reported on 5/14/2025), Disp: , Rfl:     phentermine 30 MG capsule, Take 1 capsule by mouth Every Morning. (Patient not taking: Reported on 5/14/2025), Disp: 30 capsule, Rfl: 0    Allergies   Allergen Reactions    Ropinirole Hcl Nausea And Vomiting       Family  History   Problem Relation Age of Onset    Hyperlipidemia Mother     Arthritis Father     Hyperlipidemia Father     Thyroid disease Father     Vision loss Father     Lung cancer Maternal Grandfather 79    Hodgkin's lymphoma Paternal Grandfather 61    Ovarian cancer Maternal Aunt 55    Breast cancer Maternal Cousin         Dx 40s    Lung cancer Paternal Uncle 67       Cancer-related family history includes Breast cancer in her maternal cousin; Lung cancer (age of onset: 67) in her paternal uncle; Lung cancer (age of onset: 79) in her maternal grandfather; Ovarian cancer (age of onset: 55) in her maternal aunt.    Social History     Tobacco Use    Smoking status: Never     Passive exposure: Never    Smokeless tobacco: Never   Vaping Use    Vaping status: Never Used   Substance Use Topics    Alcohol use: Yes     Alcohol/week: 1.0 standard drink of alcohol     Types: 1 Glasses of wine per week     Comment: social- rarely, wine    Drug use: No       I have reviewed and confirmed the accuracy of the patient's history: Chief complaint, HPI, ROS, and Subjective as entered by the MA/LPN/RN.  05/14/25      SUBJECTIVE:     5/14/2025: Gosia is here today for her routine yearly follow-up.  She reports she is doing very well.  Her only complaint is that she has pain from her arthritis.  Otherwise no new concerns or symptoms.  She is compliant with a monthly self breast exam and does not have any concerns or symptoms.  She does note that she has an inverted nipple on the left breast that has been present for approximately 10 years and she is unsure if she should be concerned about this finding.    REVIEW OF SYSTEMS:  Review of Systems   Constitutional:  Negative for chills, fatigue and fever.   HENT:  Negative for congestion, drooling, ear discharge, rhinorrhea, sinus pressure and tinnitus.    Eyes:  Negative for photophobia, pain and discharge.   Respiratory:  Negative for apnea, choking and stridor.    Cardiovascular:  Negative  "for palpitations.   Gastrointestinal:  Negative for abdominal distention, abdominal pain and anal bleeding.   Endocrine: Negative for polydipsia and polyphagia.   Genitourinary:  Negative for decreased urine volume, flank pain and genital sores.   Musculoskeletal:  Negative for gait problem, neck pain and neck stiffness.   Skin:  Negative for color change, rash and wound.   Neurological:  Negative for tremors, seizures, syncope, facial asymmetry and speech difficulty.   Hematological:  Negative for adenopathy.   Psychiatric/Behavioral:  Negative for agitation, confusion, hallucinations and self-injury. The patient is not hyperactive.        OBJECTIVE:    Vitals:    05/14/25 0919   BP: 161/95  Comment: white coat   Pulse: 61   Temp: 97.3 °F (36.3 °C)   TempSrc: Temporal   SpO2: 95%   Weight: 81.6 kg (179 lb 12.8 oz)   Height: 162.6 cm (64\")   PainSc: 0-No pain       Body mass index is 30.86 kg/m².    ECOG  (1) Restricted in physically strenuous activity, ambulatory and able to do work of light nature    Physical Exam  Vitals and nursing note reviewed.   Constitutional:       General: She is not in acute distress.     Appearance: She is not diaphoretic.   HENT:      Head: Normocephalic and atraumatic.   Eyes:      General: No scleral icterus.        Right eye: No discharge.         Left eye: No discharge.      Conjunctiva/sclera: Conjunctivae normal.   Neck:      Thyroid: No thyromegaly.   Cardiovascular:      Rate and Rhythm: Normal rate and regular rhythm.      Heart sounds: Normal heart sounds.      No friction rub. No gallop.   Pulmonary:      Effort: Pulmonary effort is normal. No respiratory distress.      Breath sounds: No stridor. No wheezing.   Chest:   Breasts:     Right: Normal.      Left: Inverted nipple (Small area of discoloration) present.      Comments: Right breast with scar in the upper outer quadrant from her lumpectomy.  Abdominal:      General: Bowel sounds are normal.      Palpations: Abdomen is " soft. There is no mass.      Tenderness: There is no abdominal tenderness. There is no guarding or rebound.   Musculoskeletal:         General: No tenderness. Normal range of motion.      Cervical back: Normal range of motion and neck supple.   Lymphadenopathy:      Cervical: No cervical adenopathy.      Upper Body:      Right upper body: No supraclavicular or axillary adenopathy.      Left upper body: No supraclavicular or axillary adenopathy.   Skin:     General: Skin is warm.      Findings: No erythema or rash.   Neurological:      Mental Status: She is alert and oriented to person, place, and time.      Motor: No abnormal muscle tone.   Psychiatric:         Behavior: Behavior normal.         RECENT LABS  WBC   Date Value Ref Range Status   05/14/2025 5.72 3.40 - 10.80 10*3/mm3 Final     RBC   Date Value Ref Range Status   05/14/2025 4.50 3.77 - 5.28 10*6/mm3 Final     Hemoglobin   Date Value Ref Range Status   05/14/2025 13.5 12.0 - 15.9 g/dL Final     Hematocrit   Date Value Ref Range Status   05/14/2025 41.8 34.0 - 46.6 % Final     MCV   Date Value Ref Range Status   05/14/2025 92.9 79.0 - 97.0 fL Final     MCH   Date Value Ref Range Status   05/14/2025 30.0 26.6 - 33.0 pg Final     MCHC   Date Value Ref Range Status   05/14/2025 32.3 31.5 - 35.7 g/dL Final     RDW   Date Value Ref Range Status   05/14/2025 12.1 (L) 12.3 - 15.4 % Final     RDW-SD   Date Value Ref Range Status   05/14/2025 39.8 37.0 - 54.0 fl Final     MPV   Date Value Ref Range Status   05/14/2025 9.5 6.0 - 12.0 fL Final     Platelets   Date Value Ref Range Status   05/14/2025 212 140 - 450 10*3/mm3 Final     Neutrophil %   Date Value Ref Range Status   05/14/2025 49.6 42.7 - 76.0 % Final     Lymphocyte %   Date Value Ref Range Status   05/14/2025 37.9 19.6 - 45.3 % Final     Monocyte %   Date Value Ref Range Status   05/14/2025 8.4 5.0 - 12.0 % Final     Eosinophil %   Date Value Ref Range Status   05/14/2025 3.8 0.3 - 6.2 % Final      Basophil %   Date Value Ref Range Status   05/14/2025 0.3 0.0 - 1.5 % Final     Immature Grans %   Date Value Ref Range Status   08/01/2022 0.0 0.0 - 0.5 % Final     Neutrophils, Absolute   Date Value Ref Range Status   05/14/2025 2.83 1.70 - 7.00 10*3/mm3 Final     Lymphocytes, Absolute   Date Value Ref Range Status   05/14/2025 2.17 0.70 - 3.10 10*3/mm3 Final     Monocytes, Absolute   Date Value Ref Range Status   05/14/2025 0.48 0.10 - 0.90 10*3/mm3 Final     Eosinophils, Absolute   Date Value Ref Range Status   05/14/2025 0.22 0.00 - 0.40 10*3/mm3 Final     Basophils, Absolute   Date Value Ref Range Status   05/14/2025 0.02 0.00 - 0.20 10*3/mm3 Final     Immature Grans, Absolute   Date Value Ref Range Status   08/01/2022 0.00 0.00 - 0.05 10*3/mm3 Final     nRBC   Date Value Ref Range Status   08/01/2022 0.0 0.0 - 0.2 /100 WBC Final       Lab Results   Component Value Date    GLUCOSE 99 05/14/2025    BUN 26 (H) 05/14/2025    CREATININE 0.72 05/14/2025    EGFRIFNONA 82 07/19/2021    BCR 36.1 (H) 05/14/2025    K 4.7 05/14/2025    CO2 26.6 05/14/2025    CALCIUM 9.6 05/14/2025    ALBUMIN 4.4 05/14/2025    AST 19 05/14/2025    ALT 16 05/14/2025         Assessment & Plan     Adenocarcinoma of breast, unspecified laterality  - CBC & Differential  - Comprehensive Metabolic Panel  - MRI Breast Bilateral Diagnostic W WO Contrast    Dense breast tissue  - MRI Breast Bilateral Diagnostic W WO Contrast    Other signs and symptoms in breast  - MRI Breast Bilateral Diagnostic W WO Contrast        ASSESSMENT:    Adenocarcinoma of breast, unspecified laterality  - CBC & Differential        History of stage Ia triple negative breast cancer T1b N0 M0 diagnosed in 2013 status post right lumpectomy sentinel lymph node biopsy.  This was followed by adjuvant right breast radiation.  Ongoing surveillance now on a yearly basis  History of iron deficiency anemia  Dense breast tissue: Patient is interested in utilizing breast MRI for  her screening.  She wants to have breast MRI every other year and continue with her annual mammograms.  Family history of malignancies as well as personal history: Referred to genetic counselor.  She had comprehensive gene test which was negative for any deleterious mutation.  She had comprehensive genetic test which was negative for any deleterious mutation in all 71 genes analyzed.  7/8/2024      Plans     Bilateral breast MRI next due 6/11/2026  Continue annual mammogram surveillance images next due May 2026.  (PCP is ordering)  Reviewed her genetic results  Reviewed her labs results  She will be seen on a yearly basis  Schedule follow-up May 2026  All questions answered  Recommended follow-up with her dermatologist for the discolored skin lesion to the left nipple.

## 2025-05-14 ENCOUNTER — OFFICE VISIT (OUTPATIENT)
Dept: ONCOLOGY | Facility: CLINIC | Age: 75
End: 2025-05-14
Payer: MEDICARE

## 2025-05-14 ENCOUNTER — TELEPHONE (OUTPATIENT)
Dept: ONCOLOGY | Facility: CLINIC | Age: 75
End: 2025-05-14
Payer: MEDICARE

## 2025-05-14 ENCOUNTER — TRANSCRIBE ORDERS (OUTPATIENT)
Dept: ADMINISTRATIVE | Facility: HOSPITAL | Age: 75
End: 2025-05-14
Payer: MEDICARE

## 2025-05-14 ENCOUNTER — LAB (OUTPATIENT)
Dept: LAB | Facility: HOSPITAL | Age: 75
End: 2025-05-14
Payer: MEDICARE

## 2025-05-14 VITALS
WEIGHT: 179.8 LBS | DIASTOLIC BLOOD PRESSURE: 95 MMHG | HEIGHT: 64 IN | HEART RATE: 61 BPM | TEMPERATURE: 97.3 F | SYSTOLIC BLOOD PRESSURE: 161 MMHG | OXYGEN SATURATION: 95 % | BODY MASS INDEX: 30.7 KG/M2

## 2025-05-14 DIAGNOSIS — C50.919 ADENOCARCINOMA OF BREAST, UNSPECIFIED LATERALITY: Primary | ICD-10-CM

## 2025-05-14 DIAGNOSIS — Z12.31 SCREENING MAMMOGRAM FOR BREAST CANCER: Primary | ICD-10-CM

## 2025-05-14 DIAGNOSIS — N64.59 OTHER SIGNS AND SYMPTOMS IN BREAST: ICD-10-CM

## 2025-05-14 DIAGNOSIS — R92.30 DENSE BREAST TISSUE: ICD-10-CM

## 2025-05-14 LAB
ALBUMIN SERPL-MCNC: 4.4 G/DL (ref 3.5–5.2)
ALBUMIN/GLOB SERPL: 1.8 G/DL
ALP SERPL-CCNC: 115 U/L (ref 39–117)
ALT SERPL W P-5'-P-CCNC: 16 U/L (ref 1–33)
ANION GAP SERPL CALCULATED.3IONS-SCNC: 6.4 MMOL/L (ref 5–15)
AST SERPL-CCNC: 19 U/L (ref 1–32)
BASOPHILS # BLD AUTO: 0.02 10*3/MM3 (ref 0–0.2)
BASOPHILS NFR BLD AUTO: 0.3 % (ref 0–1.5)
BILIRUB SERPL-MCNC: 0.3 MG/DL (ref 0–1.2)
BUN SERPL-MCNC: 26 MG/DL (ref 8–23)
BUN/CREAT SERPL: 36.1 (ref 7–25)
CALCIUM SPEC-SCNC: 9.6 MG/DL (ref 8.6–10.5)
CHLORIDE SERPL-SCNC: 104 MMOL/L (ref 98–107)
CO2 SERPL-SCNC: 26.6 MMOL/L (ref 22–29)
CREAT SERPL-MCNC: 0.72 MG/DL (ref 0.57–1)
DEPRECATED RDW RBC AUTO: 39.8 FL (ref 37–54)
EGFRCR SERPLBLD CKD-EPI 2021: 86.8 ML/MIN/1.73
EOSINOPHIL # BLD AUTO: 0.22 10*3/MM3 (ref 0–0.4)
EOSINOPHIL NFR BLD AUTO: 3.8 % (ref 0.3–6.2)
ERYTHROCYTE [DISTWIDTH] IN BLOOD BY AUTOMATED COUNT: 12.1 % (ref 12.3–15.4)
GLOBULIN UR ELPH-MCNC: 2.4 GM/DL
GLUCOSE SERPL-MCNC: 99 MG/DL (ref 65–99)
HCT VFR BLD AUTO: 41.8 % (ref 34–46.6)
HGB BLD-MCNC: 13.5 G/DL (ref 12–15.9)
HOLD SPECIMEN: NORMAL
LYMPHOCYTES # BLD AUTO: 2.17 10*3/MM3 (ref 0.7–3.1)
LYMPHOCYTES NFR BLD AUTO: 37.9 % (ref 19.6–45.3)
MCH RBC QN AUTO: 30 PG (ref 26.6–33)
MCHC RBC AUTO-ENTMCNC: 32.3 G/DL (ref 31.5–35.7)
MCV RBC AUTO: 92.9 FL (ref 79–97)
MONOCYTES # BLD AUTO: 0.48 10*3/MM3 (ref 0.1–0.9)
MONOCYTES NFR BLD AUTO: 8.4 % (ref 5–12)
NEUTROPHILS NFR BLD AUTO: 2.83 10*3/MM3 (ref 1.7–7)
NEUTROPHILS NFR BLD AUTO: 49.6 % (ref 42.7–76)
PLATELET # BLD AUTO: 212 10*3/MM3 (ref 140–450)
PMV BLD AUTO: 9.5 FL (ref 6–12)
POTASSIUM SERPL-SCNC: 4.7 MMOL/L (ref 3.5–5.2)
PROT SERPL-MCNC: 6.8 G/DL (ref 6–8.5)
RBC # BLD AUTO: 4.5 10*6/MM3 (ref 3.77–5.28)
SODIUM SERPL-SCNC: 137 MMOL/L (ref 136–145)
WBC NRBC COR # BLD AUTO: 5.72 10*3/MM3 (ref 3.4–10.8)

## 2025-05-14 PROCEDURE — 36415 COLL VENOUS BLD VENIPUNCTURE: CPT

## 2025-05-14 PROCEDURE — 80053 COMPREHEN METABOLIC PANEL: CPT | Performed by: NURSE PRACTITIONER

## 2025-05-14 PROCEDURE — 85025 COMPLETE CBC W/AUTO DIFF WBC: CPT

## 2025-05-14 NOTE — TELEPHONE ENCOUNTER
Left voicemail to return call to reschedule appointment and let her know that I canceled 05/13/2026

## 2025-05-14 NOTE — TELEPHONE ENCOUNTER
Caller: Gosia Gross    Relationship to patient: Self    Best call back number: [phone]    Chief complaint: PATIENT CALLED TO RESCHEDULE     Type of visit: LAB AND FOLLOW UP    Requested date: AFTER HER MAMMOGRAM      If rescheduling, when is the original appointment: 5-13-25

## 2025-06-17 RX ORDER — MELOXICAM 15 MG/1
15 TABLET ORAL DAILY
Qty: 90 TABLET | Refills: 1 | Status: SHIPPED | OUTPATIENT
Start: 2025-06-17

## 2025-06-17 RX ORDER — LEVOTHYROXINE SODIUM 75 UG/1
75 TABLET ORAL EVERY MORNING
Qty: 90 TABLET | Refills: 1 | Status: SHIPPED | OUTPATIENT
Start: 2025-06-17